# Patient Record
Sex: MALE | Race: WHITE | NOT HISPANIC OR LATINO
[De-identification: names, ages, dates, MRNs, and addresses within clinical notes are randomized per-mention and may not be internally consistent; named-entity substitution may affect disease eponyms.]

---

## 2017-03-22 PROBLEM — Z00.00 ENCOUNTER FOR PREVENTIVE HEALTH EXAMINATION: Status: ACTIVE | Noted: 2017-03-22

## 2017-06-20 ENCOUNTER — APPOINTMENT (OUTPATIENT)
Dept: CARDIOLOGY | Facility: CLINIC | Age: 57
End: 2017-06-20

## 2017-07-24 ENCOUNTER — APPOINTMENT (OUTPATIENT)
Dept: CARDIOLOGY | Facility: CLINIC | Age: 57
End: 2017-07-24

## 2017-07-25 ENCOUNTER — APPOINTMENT (OUTPATIENT)
Dept: CARDIOLOGY | Facility: CLINIC | Age: 57
End: 2017-07-25

## 2018-03-12 ENCOUNTER — CLINICAL ADVICE (OUTPATIENT)
Age: 58
End: 2018-03-12

## 2018-03-26 ENCOUNTER — APPOINTMENT (OUTPATIENT)
Dept: CARDIOTHORACIC SURGERY | Facility: CLINIC | Age: 58
End: 2018-03-26
Payer: COMMERCIAL

## 2018-03-26 VITALS
HEIGHT: 74 IN | OXYGEN SATURATION: 96 % | BODY MASS INDEX: 28.49 KG/M2 | SYSTOLIC BLOOD PRESSURE: 126 MMHG | HEART RATE: 69 BPM | WEIGHT: 222 LBS | DIASTOLIC BLOOD PRESSURE: 80 MMHG

## 2018-03-26 PROCEDURE — 99245 OFF/OP CONSLTJ NEW/EST HI 55: CPT

## 2018-05-07 ENCOUNTER — MEDICATION RENEWAL (OUTPATIENT)
Age: 58
End: 2018-05-07

## 2018-06-29 ENCOUNTER — RECORD ABSTRACTING (OUTPATIENT)
Age: 58
End: 2018-06-29

## 2018-06-29 DIAGNOSIS — Z80.42 FAMILY HISTORY OF MALIGNANT NEOPLASM OF PROSTATE: ICD-10-CM

## 2018-06-29 DIAGNOSIS — Z78.9 OTHER SPECIFIED HEALTH STATUS: ICD-10-CM

## 2018-06-29 DIAGNOSIS — K21.9 GASTRO-ESOPHAGEAL REFLUX DISEASE W/OUT ESOPHAGITIS: ICD-10-CM

## 2018-06-29 DIAGNOSIS — Z82.49 FAMILY HISTORY OF ISCHEMIC HEART DISEASE AND OTHER DISEASES OF THE CIRCULATORY SYSTEM: ICD-10-CM

## 2018-06-29 DIAGNOSIS — Z82.3 FAMILY HISTORY OF STROKE: ICD-10-CM

## 2018-07-06 ENCOUNTER — MEDICATION RENEWAL (OUTPATIENT)
Age: 58
End: 2018-07-06

## 2018-07-24 ENCOUNTER — NON-APPOINTMENT (OUTPATIENT)
Age: 58
End: 2018-07-24

## 2018-07-24 ENCOUNTER — APPOINTMENT (OUTPATIENT)
Dept: CARDIOLOGY | Facility: CLINIC | Age: 58
End: 2018-07-24
Payer: COMMERCIAL

## 2018-07-24 VITALS
HEIGHT: 74 IN | SYSTOLIC BLOOD PRESSURE: 120 MMHG | BODY MASS INDEX: 27.59 KG/M2 | DIASTOLIC BLOOD PRESSURE: 80 MMHG | WEIGHT: 215 LBS | HEART RATE: 76 BPM

## 2018-07-24 PROCEDURE — 99214 OFFICE O/P EST MOD 30 MIN: CPT

## 2018-07-24 PROCEDURE — 93000 ELECTROCARDIOGRAM COMPLETE: CPT

## 2018-08-06 ENCOUNTER — MEDICATION RENEWAL (OUTPATIENT)
Age: 58
End: 2018-08-06

## 2018-08-06 RX ORDER — VALSARTAN 80 MG/1
80 TABLET, COATED ORAL DAILY
Qty: 90 | Refills: 3 | Status: DISCONTINUED | COMMUNITY
End: 2018-08-06

## 2019-07-10 ENCOUNTER — RX RENEWAL (OUTPATIENT)
Age: 59
End: 2019-07-10

## 2019-07-17 ENCOUNTER — APPOINTMENT (OUTPATIENT)
Dept: CARDIOLOGY | Facility: CLINIC | Age: 59
End: 2019-07-17
Payer: COMMERCIAL

## 2019-07-17 ENCOUNTER — NON-APPOINTMENT (OUTPATIENT)
Age: 59
End: 2019-07-17

## 2019-07-17 VITALS
BODY MASS INDEX: 28.36 KG/M2 | DIASTOLIC BLOOD PRESSURE: 60 MMHG | HEART RATE: 71 BPM | WEIGHT: 221 LBS | OXYGEN SATURATION: 97 % | HEIGHT: 74 IN | SYSTOLIC BLOOD PRESSURE: 110 MMHG

## 2019-07-17 PROCEDURE — 99214 OFFICE O/P EST MOD 30 MIN: CPT

## 2019-07-17 PROCEDURE — 93000 ELECTROCARDIOGRAM COMPLETE: CPT

## 2019-07-24 NOTE — DISCUSSION/SUMMARY
[FreeTextEntry1] : 58-year-old gentleman. History of mitral valve repair many years ago. Class I. Functional status. Stable Blood pressure.Noted to have hyperglycemia with elevated triglyceride and elevated glucose. He is not following his dietary restrictions and does not do regular exercises as  in the past. EKG without any acute changes.\par \par At present. Recommended him.\par Echocardiogram ordered for evaluation of Mitral valve repair, mitral insufficiency, left atrial size\par Continue with dietary restrictions. Regular exercise program.\par SBE prophylaxis.\par More aggressive dietary restrictions were saturated with carbohydrate intake.\par fish oil 1 g twice daily\par He does not want to increase atorvastatin at present\par Repeat blood glucose level and lipid panel.\par If continued elevated triglycerides and LDL cholesterol increase atorvastatin to 20 mg or 40 mg dosage.\par High risk symptoms to notify us were reviewed\par Preventive care with your office\par \par All the above were at length reviewed. Answered all the questions. Thank you very much for this kind referral. Please do not hesitate to give me a call for any question.\par Part of this transcription was done with voice recognition software and phonetically similar errors are common. I apologize for that. Please donot hesitate to call for any questions due to above.\par \par Followup is planned in one year.\par \par ADD\par Echocardiogram is being repeated because last echocardiogram done 2 years ago, had shown mitral valve gradient of 4 mmHg in the presence of mitral valve repair, suggestive of mitral stenosis. This needs to be followed more closely even when he is asymptomatic. Any significant worsening of mitral stenosis can lead to worsening. Chamber dimensions and increased risk of cardiac arrhythmia.

## 2019-07-24 NOTE — REVIEW OF SYSTEMS
[Under Stress] : under stress [Negative] : Heme/Lymph [Memory Lapses Or Loss] : no memory lapses or loss [Confusion] : no confusion was observed [Depression] : no depression [Anxiety] : no anxiety [Suicidal] : not suicidal

## 2019-07-24 NOTE — ASSESSMENT
[FreeTextEntry1] : EKG ordered and interpreted by me. Normal sinus rhythm.\par \par Reviewed on July 17, 2019\par EKG normal sinus rhythm. Incomplete right bundle branch block.\par Labs platelet count 120, hemoglobin 14.7, white count 5.9, sodium 140, potassium 4.2, creatinine 0.8, glucose 113 LFTs normal. Triglycerides 333, HDL 32, LDL 92, total cholesterol 191

## 2019-07-24 NOTE — REASON FOR VISIT
[Follow-Up - Clinic] : a clinic follow-up of [Hyperlipidemia] : hyperlipidemia [Hypertension] : hypertension [Mitral Regurgitation] : mitral regurgitation [FreeTextEntry1] : 58-year-old gentleman comes for followup consultation after one year in presence of a history of\par Mitral valve repair for mitral regurgitation.\par Essential hypertension.\par Hyperlipidemia.\par \par Offers no complaint of chest pain. No PND, orthopnea, or palpitations.\par No pedal edema.\par Good exercise tolerance without any symptoms.\par Stable. Diet and activity.\par No claudication pain.\par No recent hospitalization.\par

## 2019-07-24 NOTE — PHYSICAL EXAM
[General Appearance - Well Developed] : well developed [Normal Appearance] : normal appearance [Well Groomed] : well groomed [General Appearance - Well Nourished] : well nourished [No Deformities] : no deformities [General Appearance - In No Acute Distress] : no acute distress [Normal Conjunctiva] : the conjunctiva exhibited no abnormalities [Eyelids - No Xanthelasma] : the eyelids demonstrated no xanthelasmas [No Oral Pallor] : no oral pallor [Normal Jugular Venous A Waves Present] : normal jugular venous A waves present [Normal Jugular Venous V Waves Present] : normal jugular venous V waves present [No Jugular Venous Barajas A Waves] : no jugular venous barajas A waves [] : no respiratory distress [Respiration, Rhythm And Depth] : normal respiratory rhythm and effort [Exaggerated Use Of Accessory Muscles For Inspiration] : no accessory muscle use [Auscultation Breath Sounds / Voice Sounds] : lungs were clear to auscultation bilaterally [Heart Rate And Rhythm] : heart rate and rhythm were normal [Heart Sounds] : normal S1 and S2 [Murmurs] : no murmurs present [Arterial Pulses Normal] : the arterial pulses were normal [Edema] : no peripheral edema present [Veins - Varicosity Changes] : no varicosital changes were noted in the lower extremities [Abdomen Soft] : soft [Abnormal Walk] : normal gait [Nail Clubbing] : no clubbing of the fingernails [Cyanosis, Localized] : no localized cyanosis [Oriented To Time, Place, And Person] : oriented to person, place, and time [No Anxiety] : not feeling anxious [FreeTextEntry1] : No gallop, rub, heave no bruits

## 2019-07-25 ENCOUNTER — APPOINTMENT (OUTPATIENT)
Dept: CARDIOLOGY | Facility: CLINIC | Age: 59
End: 2019-07-25

## 2019-07-26 ENCOUNTER — APPOINTMENT (OUTPATIENT)
Dept: CARDIOLOGY | Facility: CLINIC | Age: 59
End: 2019-07-26

## 2019-09-03 ENCOUNTER — RESULT REVIEW (OUTPATIENT)
Age: 59
End: 2019-09-03

## 2019-09-03 ENCOUNTER — APPOINTMENT (OUTPATIENT)
Dept: CARDIOLOGY | Facility: CLINIC | Age: 59
End: 2019-09-03
Payer: COMMERCIAL

## 2019-09-03 PROCEDURE — 93306 TTE W/DOPPLER COMPLETE: CPT

## 2019-09-04 ENCOUNTER — APPOINTMENT (OUTPATIENT)
Dept: CARDIOLOGY | Facility: CLINIC | Age: 59
End: 2019-09-04

## 2020-07-28 ENCOUNTER — APPOINTMENT (OUTPATIENT)
Dept: CARDIOLOGY | Facility: CLINIC | Age: 60
End: 2020-07-28
Payer: COMMERCIAL

## 2020-07-28 VITALS
HEART RATE: 72 BPM | SYSTOLIC BLOOD PRESSURE: 112 MMHG | HEIGHT: 74 IN | DIASTOLIC BLOOD PRESSURE: 64 MMHG | OXYGEN SATURATION: 96 % | BODY MASS INDEX: 27.21 KG/M2 | WEIGHT: 212 LBS

## 2020-07-28 PROCEDURE — 99214 OFFICE O/P EST MOD 30 MIN: CPT

## 2020-07-28 NOTE — REASON FOR VISIT
[Hyperlipidemia] : hyperlipidemia [Follow-Up - Clinic] : a clinic follow-up of [Hypertension] : hypertension [Mitral Regurgitation] : mitral regurgitation [FreeTextEntry1] : 59-year-old gentleman comes for followup consultation after one year in presence of a history of\par Mitral valve repair for mitral regurgitation.  Mild residual mitral regurgitation\par Essential hypertension.\par Hyperlipidemia.\par \par Offers no complaint of chest pain. No PND, orthopnea, or palpitations.\par No pedal edema.\par Good exercise tolerance without any symptoms.\par Stable. Diet and activity.\par No claudication pain.\par No recent hospitalization.\par I have reviewed his labs

## 2020-07-28 NOTE — PHYSICAL EXAM
[General Appearance - Well Developed] : well developed [Normal Appearance] : normal appearance [Well Groomed] : well groomed [No Deformities] : no deformities [General Appearance - Well Nourished] : well nourished [Normal Conjunctiva] : the conjunctiva exhibited no abnormalities [General Appearance - In No Acute Distress] : no acute distress [Eyelids - No Xanthelasma] : the eyelids demonstrated no xanthelasmas [No Oral Pallor] : no oral pallor [Normal Jugular Venous A Waves Present] : normal jugular venous A waves present [Normal Jugular Venous V Waves Present] : normal jugular venous V waves present [No Jugular Venous Barajas A Waves] : no jugular venous barajas A waves [] : no respiratory distress [Respiration, Rhythm And Depth] : normal respiratory rhythm and effort [Exaggerated Use Of Accessory Muscles For Inspiration] : no accessory muscle use [Auscultation Breath Sounds / Voice Sounds] : lungs were clear to auscultation bilaterally [Heart Rate And Rhythm] : heart rate and rhythm were normal [Heart Sounds] : normal S1 and S2 [Murmurs] : no murmurs present [Edema] : no peripheral edema present [Arterial Pulses Normal] : the arterial pulses were normal [Veins - Varicosity Changes] : no varicosital changes were noted in the lower extremities [Abdomen Soft] : soft [Nail Clubbing] : no clubbing of the fingernails [Abnormal Walk] : normal gait [Cyanosis, Localized] : no localized cyanosis [No Anxiety] : not feeling anxious [Oriented To Time, Place, And Person] : oriented to person, place, and time [FreeTextEntry1] : No gallop, rub, heave no bruits

## 2020-07-28 NOTE — REVIEW OF SYSTEMS
[Under Stress] : under stress [Negative] : Endocrine [Confusion] : no confusion was observed [Memory Lapses Or Loss] : no memory lapses or loss [Depression] : no depression [Anxiety] : no anxiety [Suicidal] : not suicidal

## 2020-07-28 NOTE — ASSESSMENT
[FreeTextEntry1] : EKG ordered and interpreted by me. Normal sinus rhythm.\par \par Reviewed on July 17, 2019\par EKG normal sinus rhythm. Incomplete right bundle branch block.\par Labs platelet count 120, hemoglobin 14.7, white count 5.9, sodium 140, potassium 4.2, creatinine 0.8, glucose 113 LFTs normal. Triglycerides 333, HDL 32, LDL 92, total cholesterol 191\par \par Reviewed on July 28, 2020\par Echocardiogram September 3, 2019 EF 55% minimal mitral regurgitation mean mitral gradient 4 mmHg mildly dilated left atrium pulmonary pressures normal\par Labs from July 21, 2020 CBC relatively stable creatinine 0.82 potassium 4.6 sodium 140 LFT normal  HDL 34 triglycerides 221 total cholesterol 179

## 2020-07-28 NOTE — DISCUSSION/SUMMARY
[FreeTextEntry1] : 59-year-old gentleman. History of mitral valve repair many years ago. Class I. Functional status. Stable Blood pressure.Noted to have hyperglycemia with elevated triglyceride and elevated glucose.\par \par At present. Recommended him.\par Echocardiogram ordered for evaluation of Mitral valve repair, residual mitral insufficiency, left atrial size\par Continue with dietary restrictions. Regular exercise program.\par SBE prophylaxis.\par More aggressive dietary restrictions were saturated with carbohydrate intake.\par fish oil 1 g twice daily\par Last office visit he did not want increased dose of atorvastatin.  He will continue with aggressive lifestyle and risk factor modifications.\par Follow labs with your office which includes blood glucose level\par Preventive care with your office\par \par Counseling regarding low saturated fat, salt and carbohydrate intake was reviewed. Active lifestyle and regular. Exercise along with weight management is advised.\par All the above were at length reviewed. Answered all the questions. Thank you very much for this kind referral. Please do not hesitate to give me a call for any question.\par Part of this transcription was done with voice recognition software and phonetically similar errors are common. I apologize for that. Please donot hesitate to call for any questions due to above.\par \par Followup is planned in one year.\par \par ADD\par Echocardiogram is being repeated because last echocardiogram done 2 years ago, had shown mitral valve gradient of 4 mmHg in the presence of mitral valve repair, suggestive of mitral stenosis. This needs to be followed more closely even when he is asymptomatic. Any significant worsening of mitral stenosis can lead to worsening. Chamber dimensions and increased risk of cardiac arrhythmia.

## 2020-10-21 ENCOUNTER — APPOINTMENT (OUTPATIENT)
Dept: CARDIOLOGY | Facility: CLINIC | Age: 60
End: 2020-10-21
Payer: COMMERCIAL

## 2020-10-21 PROCEDURE — 93306 TTE W/DOPPLER COMPLETE: CPT

## 2020-10-23 ENCOUNTER — APPOINTMENT (OUTPATIENT)
Dept: CARDIOLOGY | Facility: CLINIC | Age: 60
End: 2020-10-23
Payer: COMMERCIAL

## 2020-10-23 VITALS — BODY MASS INDEX: 27.59 KG/M2 | WEIGHT: 215 LBS | HEIGHT: 74 IN

## 2020-10-23 PROCEDURE — 99442: CPT

## 2020-10-23 NOTE — ASSESSMENT
[FreeTextEntry1] : EKG ordered and interpreted by me. Normal sinus rhythm.\par \par Reviewed on July 17, 2019\par EKG normal sinus rhythm. Incomplete right bundle branch block.\par Labs platelet count 120, hemoglobin 14.7, white count 5.9, sodium 140, potassium 4.2, creatinine 0.8, glucose 113 LFTs normal. Triglycerides 333, HDL 32, LDL 92, total cholesterol 191\par \par Reviewed on July 28, 2020\par Echocardiogram September 3, 2019 EF 55% minimal mitral regurgitation mean mitral gradient 4 mmHg mildly dilated left atrium pulmonary pressures normal\par Labs from July 21, 2020 CBC relatively stable creatinine 0.82 potassium 4.6 sodium 140 LFT normal  HDL 34 triglycerides 221 total cholesterol 179\par \par Reviewed on October 23, 2020.\par Echocardiogram reviewed\par Labs reviewed with normal lipoprotein a done on October 14, 2020.

## 2020-10-23 NOTE — DISCUSSION/SUMMARY
[FreeTextEntry1] : 59-year-old gentleman. History of mitral and tricuspid valve repair many years ago. Class I. Functional status. Stable Blood pressure.Noted to have hyperglycemia with elevated triglyceride and elevated glucose.\par At present stable echocardiogram reviewed at length.\par \par \par At present. Recommended him.\par \par Continue with dietary restrictions. Regular exercise program.\par SBE prophylaxis.\par More aggressive dietary restrictions were saturated with carbohydrate intake.\par fish oil 1 g twice daily\par Last office visit he did not want increased dose of atorvastatin.  He will continue with aggressive lifestyle and risk factor modifications.\par Follow labs with your office which includes blood glucose level\par Preventive care with your office\par \par Counseling regarding low saturated fat, salt and carbohydrate intake was reviewed. Active lifestyle and regular. Exercise along with weight management is advised.\par All the above were at length reviewed. Answered all the questions. Thank you very much for this kind referral. Please do not hesitate to give me a call for any question.\par Part of this transcription was done with voice recognition software and phonetically similar errors are common. I apologize for that. Please donot hesitate to call for any questions due to above.\par \par Followup is planned in one year.\par

## 2020-10-23 NOTE — REASON FOR VISIT
[FreeTextEntry2] : telephone visit  [FreeTextEntry1] : Consent: Patient consented to telephone visit.\par Time: A total of 12  minutes was spent in review of pertinent medical records, discussion with the patient, evaluation of patient problem, and coordination of a care plan as part of this telephone visit.\par Physical examination was not performed as a  the risk of COVID-19 cross-contamination to be greater than the benefit to the patient conferred by the physical examination.\par \par \par 59-year-old gentleman comes for 2 way audio consultation to review echocardiogram in presence of a history of\par Mitral valve repair for mitral regurgitation.  Mild residual mitral regurgitation .\par  tricuspid valve repair.\par Essential hypertension.\par Hyperlipidemia.\par \par Offers no complaint of exertional chest pain. No PND, orthopnea, or palpitations.\par No pedal edema.\par Good exercise tolerance without any symptoms.\par Stable. Diet and activity.\par No claudication pain.\par No recent hospitalization.\par I have reviewed his labs

## 2020-10-23 NOTE — REVIEW OF SYSTEMS
[Confusion] : no confusion was observed [Memory Lapses Or Loss] : no memory lapses or loss [Depression] : no depression [Anxiety] : no anxiety [Under Stress] : under stress [Suicidal] : not suicidal [Negative] : Heme/Lymph

## 2020-11-06 DIAGNOSIS — R79.89 OTHER SPECIFIED ABNORMAL FINDINGS OF BLOOD CHEMISTRY: ICD-10-CM

## 2021-09-22 ENCOUNTER — NON-APPOINTMENT (OUTPATIENT)
Age: 61
End: 2021-09-22

## 2021-09-22 ENCOUNTER — APPOINTMENT (OUTPATIENT)
Dept: CARDIOLOGY | Facility: CLINIC | Age: 61
End: 2021-09-22
Payer: COMMERCIAL

## 2021-09-22 VITALS
SYSTOLIC BLOOD PRESSURE: 122 MMHG | OXYGEN SATURATION: 94 % | BODY MASS INDEX: 26.44 KG/M2 | HEART RATE: 67 BPM | WEIGHT: 206 LBS | HEIGHT: 74 IN | DIASTOLIC BLOOD PRESSURE: 70 MMHG

## 2021-09-22 PROCEDURE — 93000 ELECTROCARDIOGRAM COMPLETE: CPT

## 2021-09-22 PROCEDURE — 99214 OFFICE O/P EST MOD 30 MIN: CPT

## 2021-09-22 NOTE — DISCUSSION/SUMMARY
[FreeTextEntry1] : 60-year-old gentleman. History of mitral and tricuspid valve repair many years ago. Class I. Functional status.  Stable blood pressure.  Lipid panel with LDL cholesterol greater than 100.  HDL cholesterol less than 40.  Triglycerides stable.\par \par \par \par At present. Recommended him.\par Echocardiogram follow-up on mitral valve and tricuspid valve repair which is more than 10 years ago with residual mitral regurgitation.  If any worsening noted will have to do a closer follow-up.\par Consider increasing dose of atorvastatin to 20 mg.  Careful follow-up on any side effects.  He would like to discuss this with you and start.  \par Continue with dietary restrictions. Regular exercise program.\par SBE prophylaxis.\par More aggressive dietary restrictions were saturated with carbohydrate intake.\par Preventive care with your office\par \par Counseling regarding low saturated fat, salt and carbohydrate intake was reviewed. Active lifestyle and regular. Exercise along with weight management is advised.\par All the above were at length reviewed. Answered all the questions. Thank you very much for this kind referral. Please do not hesitate to give me a call for any question.\par Part of this transcription was done with voice recognition software and phonetically similar errors are common. I apologize for that. Please donot hesitate to call for any questions due to above.\par \par Followup is planned in one year.\par He will call me for review after his echocardiogram.\par

## 2021-09-22 NOTE — ASSESSMENT
[FreeTextEntry1] : EKG ordered and interpreted by me. Normal sinus rhythm.\par \par Reviewed on July 17, 2019\par EKG normal sinus rhythm. Incomplete right bundle branch block.\par Labs platelet count 120, hemoglobin 14.7, white count 5.9, sodium 140, potassium 4.2, creatinine 0.8, glucose 113 LFTs normal. Triglycerides 333, HDL 32, LDL 92, total cholesterol 191\par \par Reviewed on July 28, 2020\par Echocardiogram September 3, 2019 EF 55% minimal mitral regurgitation mean mitral gradient 4 mmHg mildly dilated left atrium pulmonary pressures normal\par Labs from July 21, 2020 CBC relatively stable creatinine 0.82 potassium 4.6 sodium 140 LFT normal  HDL 34 triglycerides 221 total cholesterol 179\par \par Reviewed on October 23, 2020.\par Echocardiogram reviewed\par Labs reviewed with normal lipoprotein a done on October 14, 2020.\par \par Reviewed September 22, 2021.\par Labs from August 11, 2021.  Sodium 140 potassium 4.1 creatinine 0.84.  Total cholesterol 180 triglycerides 128 HDL 38 .

## 2021-09-22 NOTE — CARDIOLOGY SUMMARY
[de-identified] : September 22, 2021.  Normal sinus rhythm [___] : [unfilled] [LVEF ___%] : LVEF [unfilled]% [None] : no pulmonary hypertension [Normal] : normal LA size

## 2021-09-22 NOTE — REASON FOR VISIT
[Structural Heart and Valve Disease] : structural heart and valve disease [Hyperlipidemia] : hyperlipidemia [Hypertension] : hypertension [FreeTextEntry3] : Dr. Cameron [FreeTextEntry1] : 60-year-old gentleman is seen in follow-up consultation to review labs, medical management in presence of history of\par Mitral valve repair for mitral regurgitation.  2010 mild residual mitral regurgitation .\par  tricuspid valve repair.  2010\par Essential hypertension.\par Hyperlipidemia.\par \par Offers no complaint of exertional chest pain. No PND, orthopnea, or palpitations.  Recently hiked in [facility] in [place] almost 28 miles without any significant symptoms.\par No pedal edema.\par Good exercise tolerance without any symptoms.\par Stable. Diet and activity.\par No claudication pain.\par No recent hospitalization.\par Earlier in the year he was evaluated for nonspecific abdominal pain with multiple testing including colonoscopy endoscopy.  No abnormality found.  It resolved on its own. [FreeTextEntry2] : telephone visit

## 2021-10-26 ENCOUNTER — APPOINTMENT (OUTPATIENT)
Dept: CARDIOLOGY | Facility: CLINIC | Age: 61
End: 2021-10-26

## 2022-02-09 ENCOUNTER — NON-APPOINTMENT (OUTPATIENT)
Age: 62
End: 2022-02-09

## 2022-02-10 ENCOUNTER — NON-APPOINTMENT (OUTPATIENT)
Age: 62
End: 2022-02-10

## 2022-02-11 ENCOUNTER — APPOINTMENT (OUTPATIENT)
Dept: CARDIOLOGY | Facility: CLINIC | Age: 62
End: 2022-02-11

## 2022-02-11 ENCOUNTER — NON-APPOINTMENT (OUTPATIENT)
Age: 62
End: 2022-02-11

## 2022-02-16 ENCOUNTER — NON-APPOINTMENT (OUTPATIENT)
Age: 62
End: 2022-02-16

## 2022-02-18 ENCOUNTER — APPOINTMENT (OUTPATIENT)
Dept: CARDIOLOGY | Facility: CLINIC | Age: 62
End: 2022-02-18

## 2022-04-06 ENCOUNTER — APPOINTMENT (OUTPATIENT)
Dept: CARDIOLOGY | Facility: CLINIC | Age: 62
End: 2022-04-06
Payer: COMMERCIAL

## 2022-04-06 VITALS
HEART RATE: 71 BPM | SYSTOLIC BLOOD PRESSURE: 118 MMHG | WEIGHT: 218 LBS | OXYGEN SATURATION: 95 % | DIASTOLIC BLOOD PRESSURE: 60 MMHG | HEIGHT: 74 IN | BODY MASS INDEX: 27.98 KG/M2

## 2022-04-06 PROCEDURE — 99214 OFFICE O/P EST MOD 30 MIN: CPT

## 2022-04-06 PROCEDURE — 93306 TTE W/DOPPLER COMPLETE: CPT

## 2022-04-06 NOTE — ASSESSMENT
[FreeTextEntry1] : EKG ordered and interpreted by me. Normal sinus rhythm.\par \par Reviewed on July 17, 2019\par EKG normal sinus rhythm. Incomplete right bundle branch block.\par Labs platelet count 120, hemoglobin 14.7, white count 5.9, sodium 140, potassium 4.2, creatinine 0.8, glucose 113 LFTs normal. Triglycerides 333, HDL 32, LDL 92, total cholesterol 191\par \par Reviewed on July 28, 2020\par Echocardiogram September 3, 2019 EF 55% minimal mitral regurgitation mean mitral gradient 4 mmHg mildly dilated left atrium pulmonary pressures normal\par Labs from July 21, 2020 CBC relatively stable creatinine 0.82 potassium 4.6 sodium 140 LFT normal  HDL 34 triglycerides 221 total cholesterol 179\par \par Reviewed on October 23, 2020.\par Echocardiogram reviewed\par Labs reviewed with normal lipoprotein a done on October 14, 2020.\par \par Reviewed September 22, 2021.\par Labs from August 11, 2021.  Sodium 140 potassium 4.1 creatinine 0.84.  Total cholesterol 180 triglycerides 128 HDL 38 .\par \par Reviewed on April 6, 2022.\par Labs from August 11, 2021 were again reviewed in relation to lipid panel.\par Echocardiogram done today for 6/20/2022 was reviewed.  It was read and interpreted by me.

## 2022-04-06 NOTE — PHYSICAL EXAM
[Well Developed] : well developed [Well Nourished] : well nourished [No Acute Distress] : no acute distress [Normal Venous Pressure] : normal venous pressure [No Carotid Bruit] : no carotid bruit [Normal S1, S2] : normal S1, S2 [No Murmur] : no murmur [No Rub] : no rub [No Gallop] : no gallop [Clear Lung Fields] : clear lung fields [Good Air Entry] : good air entry [No Respiratory Distress] : no respiratory distress  [Soft] : abdomen soft [Non Tender] : non-tender [No Masses/organomegaly] : no masses/organomegaly [Normal Bowel Sounds] : normal bowel sounds [Normal Gait] : normal gait [No Edema] : no edema [No Cyanosis] : no cyanosis [No Clubbing] : no clubbing [No Varicosities] : no varicosities [Normal Radial B/L] : normal radial B/L [Normal PT B/L] : normal PT B/L [Normal DP B/L] : normal DP B/L [Moves all extremities] : moves all extremities [Normal Speech] : normal speech [Alert and Oriented] : alert and oriented

## 2022-04-06 NOTE — REASON FOR VISIT
[Structural Heart and Valve Disease] : structural heart and valve disease [Hyperlipidemia] : hyperlipidemia [Hypertension] : hypertension [FreeTextEntry3] : Dr. Cameron [FreeTextEntry1] : 61-year-old gentleman is seen in follow-up consultation to review labs, medical management in presence of history of\par Mitral valve repair for mitral regurgitation.  2010 mild residual mitral regurgitation/mild increase mitral valve gradient.\par And tricuspid valve repair.  2010\par Essential hypertension.  No CHF.  No CKD.  Non-smoker\par Hyperlipidemia.  On statin therapy\par \par Also complaining of right thigh intermittent localized pain.  With or without exertion.  No weakness.\par \par Offers no complaint of exertional chest pain. No PND, orthopnea, or palpitations.  \par No pedal edema.\par Good exercise tolerance without any symptoms.\par Stable. Diet and activity.\par No claudication pain.\par No recent hospitalization.

## 2022-04-06 NOTE — DISCUSSION/SUMMARY
[FreeTextEntry1] : 61-year-old gentleman with above medical history and active medical problems as noted below\par 1.  Mitral and tricuspid valve repair.  Minimal regurgitation noted.  Mild increase mitral gradient which is stable.  Preserved EF.  Normal chamber dimensions.  Clinically class I functional status.  Recommendation includes\par SBE prophylaxis\par Periodic follow-up\par Any change in clinical status which have been reviewed to notify us or call 911 in the form of shortness of breath, chest pain, near syncopal or syncopal event.\par 2.  Essential hypertension.  Very well controlled at present.  On medications.  Follow labs for potassium and creatinine closely with use of telmisartan.  Goal less than 130/80.  Nonpharmacological lifestyle modifications reviewed.\par 3.  Dyslipidemia.  On statin therapy.  Goal would be less than 100 preferably less than 70.  Unable to tolerate higher dose.  Lifestyle modifications reviewed.  Follow liver function tests lipid panel.  Labs ordered.\par 4.  Localized right thigh pain.  Probably musculoskeletal.  Total CPK ordered to rule out any myositis or muscular injury.  Follow-up with your office.\par \par Counseling regarding low saturated fat, salt and carbohydrate intake was reviewed. Active lifestyle and regular. Exercise along with weight management is advised.\par All the above were at length reviewed. Answered all the questions. Thank you very much for this kind referral. Please do not hesitate to give me a call for any question.\par Part of this transcription was done with voice recognition software and phonetically similar errors are common. I apologize for that. Please donot hesitate to call for any questions due to above.\par \par Followup is planned in one year.\par \par Sincerely,\par Parul Jacobson MD,FACC,FASE\par

## 2022-04-06 NOTE — CARDIOLOGY SUMMARY
[de-identified] : September 22, 2021.  Normal sinus rhythm [de-identified] : April 6, 2022.\par LVEF 60% minimal mitral regurgitation annuloplasty mitral and tricuspid position.  Mean mitral gradient 5 mmHg.  Minimal aortic regurgitation.  Minimal tricuspid regurgitation RVSP 19.  Normal left and right atrial size. [___] : [unfilled] [LVEF ___%] : LVEF [unfilled]% [None] : no pulmonary hypertension [Normal] : normal LA size

## 2022-05-18 ENCOUNTER — APPOINTMENT (OUTPATIENT)
Dept: ORTHOPEDIC SURGERY | Facility: CLINIC | Age: 62
End: 2022-05-18
Payer: COMMERCIAL

## 2022-05-18 DIAGNOSIS — Z85.831 PERSONAL HISTORY OF MALIGNANT NEOPLASM OF SOFT TISSUE: ICD-10-CM

## 2022-05-18 PROCEDURE — 99214 OFFICE O/P EST MOD 30 MIN: CPT

## 2022-05-18 PROCEDURE — 72040 X-RAY EXAM NECK SPINE 2-3 VW: CPT

## 2022-05-18 RX ORDER — CALCIUM POLYCARBOPHIL 625 MG
625 TABLET ORAL TWICE DAILY
Refills: 0 | Status: DISCONTINUED | COMMUNITY
End: 2022-05-18

## 2022-05-18 NOTE — PHYSICAL EXAM
[NL (45)] : right lateral flexion 45 degrees [NL (80)] : right lateral rotation 80 degrees [5___] : right grasp 5[unfilled]/5 [Biceps 2+] : biceps 2+ [Triceps 2+] : triceps 2+ [Brachioradialis 2+] : brachioradialis 2+ [Disc space narrowing] : Disc space narrowing [NL (0-180)] : full active abduction 0-180 degrees [NL (0-70)] : full internal rotation 0-70 degrees [NL (0-90)] : full external rotation 0-90 degrees [4 ___] : forward flexion 4[unfilled]/5 [4___] : internal rotation 4[unfilled]/5 [Right] : right shoulder [There are no fractures, subluxations or dislocations. No significant abnormalities are seen] : There are no fractures, subluxations or dislocations. No significant abnormalities are seen [Calcific density] : Calcific density [] : negative [place]

## 2022-05-18 NOTE — PROCEDURE
[Large Joint Injection] : Large joint injection [Right] : of the right [Shoulder] : shoulder [Pain] : pain [Inflammation] : inflammation [X-ray evidence of Osteoarthritis on this or prior visit] : x-ray evidence of Osteoarthritis on this or prior visit [Alcohol] : alcohol [Betadine] : betadine [Ethyl Chloride sprayed topically] : ethyl chloride sprayed topically [___ cc    6mg] :  Betamethasone (Celestone) ~Vcc of 6mg [___ cc    1%] : Lidocaine ~Vcc of 1%  [] : Patient tolerated procedure well [Call if redness, pain or fever occur] : call if redness, pain or fever occur [Apply ice for 15min out of every hour for the next 12-24 hours as tolerated] : apply ice for 15 minutes out of every hour for the next 12-24 hours as tolerated [Patient was advised to rest the joint(s) for ____ days] : patient was advised to rest the joint(s) for [unfilled] days [Previous OTC use and PT nontherapeutic] : patient has tried OTC's including aspirin, Ibuprofen, Aleve, etc or prescription NSAIDS, and/or exercises at home and/or physical therapy without satisfactory response [Patient had decreased mobility in the joint] : patient had decreased mobility in the joint [Risks, benefits, alternatives discussed / Verbal consent obtained] : the risks benefits, and alternatives have been discussed, and verbal consent was obtained [All ultrasound images have been permanently captured and stored accordingly in our picture archiving and communication system] : All ultrasound images have been permanently captured and stored accordingly in our picture archiving and communication system

## 2022-05-18 NOTE — HISTORY OF PRESENT ILLNESS
[de-identified] : Patient reports severe pain in the posterior shoulder and tightness in the neck. He also reports some pain described as being in the shoulder joint with cruching on ROM. Recently he has some numbness in the RT pinky finger as well. The pain is worse with sitting and laying down. He went to Urgent Care on Friday where xrays were taken and has been taking 800mg Motrin for the pain

## 2022-06-01 ENCOUNTER — APPOINTMENT (OUTPATIENT)
Age: 62
End: 2022-06-01
Payer: COMMERCIAL

## 2022-06-01 DIAGNOSIS — S46.011D STRAIN OF MUSCLE(S) AND TENDON(S) OF THE ROTATOR CUFF OF RIGHT SHOULDER, SUBSEQUENT ENCOUNTER: ICD-10-CM

## 2022-06-01 PROCEDURE — 99213 OFFICE O/P EST LOW 20 MIN: CPT

## 2022-06-01 NOTE — HISTORY OF PRESENT ILLNESS
[de-identified] : Patient reports improvement with the injection after the first day. The pain did return but not to where it was prior to the injection . He is still taking the muscle relaxer.

## 2022-06-27 ENCOUNTER — APPOINTMENT (OUTPATIENT)
Dept: ORTHOPEDIC SURGERY | Facility: CLINIC | Age: 62
End: 2022-06-27
Payer: COMMERCIAL

## 2022-06-27 PROCEDURE — 99213 OFFICE O/P EST LOW 20 MIN: CPT

## 2022-06-27 NOTE — HISTORY OF PRESENT ILLNESS
[de-identified] : Patient reports that some days he feels better but other days he is not able to function due to the pain. He has been doing PT, HEP, taking NSAIDs and muscle relaxers since 5-

## 2022-07-06 ENCOUNTER — APPOINTMENT (OUTPATIENT)
Dept: ORTHOPEDIC SURGERY | Facility: CLINIC | Age: 62
End: 2022-07-06

## 2022-09-14 ENCOUNTER — APPOINTMENT (OUTPATIENT)
Dept: ORTHOPEDIC SURGERY | Facility: CLINIC | Age: 62
End: 2022-09-14

## 2022-09-14 PROCEDURE — 72100 X-RAY EXAM L-S SPINE 2/3 VWS: CPT

## 2022-09-14 PROCEDURE — 99214 OFFICE O/P EST MOD 30 MIN: CPT

## 2022-09-14 PROCEDURE — 73502 X-RAY EXAM HIP UNI 2-3 VIEWS: CPT | Mod: RT

## 2022-09-14 NOTE — PHYSICAL EXAM
[NL (40)] : right lateral bending 40 degrees [4___] : right quadriceps 4[unfilled]/5 [Straightening consistent with spasm] : Straightening consistent with spasm [Facet arthropathy] : Facet arthropathy [Disc space narrowing] : Disc space narrowing [TWNoteComboBox7] : forward flexion 75 degrees [de-identified] : extension 20 degrees [Right] : right hip [NL (120)] : flexion 120 degrees [NL (30)] : extension 30 degrees [NL (35)] : adduction 35 degrees [NL (45)] : internal rotation 45 degrees [5___] : adduction 5[unfilled]/5 [2+] : posterior tibialis pulse: 2+ [] : patient ambulates without assistive device

## 2022-09-14 NOTE — HISTORY OF PRESENT ILLNESS
[de-identified] : Patient denies any injury, he reports pain in the thigh that is described as "deep in the bone and intermittently there is a lump/muscle spasm in the top of the thigh". He is also c/o lateral hip pain. He denies any groin pain or pain into the calf or lower back.

## 2022-09-20 ENCOUNTER — FORM ENCOUNTER (OUTPATIENT)
Age: 62
End: 2022-09-20

## 2022-09-25 ENCOUNTER — FORM ENCOUNTER (OUTPATIENT)
Age: 62
End: 2022-09-25

## 2022-10-30 ENCOUNTER — FORM ENCOUNTER (OUTPATIENT)
Age: 62
End: 2022-10-30

## 2022-11-02 ENCOUNTER — APPOINTMENT (OUTPATIENT)
Dept: ORTHOPEDIC SURGERY | Facility: CLINIC | Age: 62
End: 2022-11-02

## 2022-11-02 DIAGNOSIS — M54.16 RADICULOPATHY, LUMBAR REGION: ICD-10-CM

## 2022-11-02 DIAGNOSIS — M54.12 RADICULOPATHY, CERVICAL REGION: ICD-10-CM

## 2022-11-02 PROCEDURE — 99214 OFFICE O/P EST MOD 30 MIN: CPT

## 2022-11-02 NOTE — PHYSICAL EXAM
[NL (80)] : right lateral rotation 80 degrees [Biceps 2+] : biceps 2+ [Triceps 2+] : triceps 2+ [Brachioradialis 2+] : brachioradialis 2+ [NL (40)] : right lateral bending 40 degrees [Straightening consistent with spasm] : Straightening consistent with spasm [Facet arthropathy] : Facet arthropathy [Disc space narrowing] : Disc space narrowing [NL (0-180)] : full active abduction 0-180 degrees [NL (0-70)] : full internal rotation 0-70 degrees [NL (0-90)] : full external rotation 0-90 degrees [4 ___] : forward flexion 4[unfilled]/5 [4___] : internal rotation 4[unfilled]/5 [There are no fractures, subluxations or dislocations. No significant abnormalities are seen] : There are no fractures, subluxations or dislocations. No significant abnormalities are seen [Calcific density] : Calcific density [Right] : right hip [NL (120)] : flexion 120 degrees [NL (30)] : extension 30 degrees [NL (35)] : adduction 35 degrees [NL (45)] : internal rotation 45 degrees [5___] : adduction 5[unfilled]/5 [2+] : posterior tibialis pulse: 2+ [TWNoteComboBox7] : forward flexion 75 degrees [de-identified] : extension 20 degrees [] : non-antalgic

## 2022-11-02 NOTE — HISTORY OF PRESENT ILLNESS
[de-identified] : Patient is here for MRI results of the c-spine\par He is also here to follow up for the lower back and thigh, which is still an issue, despite PT, HEP and medrol.

## 2022-11-06 ENCOUNTER — FORM ENCOUNTER (OUTPATIENT)
Age: 62
End: 2022-11-06

## 2022-11-07 ENCOUNTER — FORM ENCOUNTER (OUTPATIENT)
Age: 62
End: 2022-11-07

## 2022-11-08 ENCOUNTER — FORM ENCOUNTER (OUTPATIENT)
Age: 62
End: 2022-11-08

## 2022-11-14 ENCOUNTER — FORM ENCOUNTER (OUTPATIENT)
Age: 62
End: 2022-11-14

## 2022-11-30 ENCOUNTER — NON-APPOINTMENT (OUTPATIENT)
Age: 62
End: 2022-11-30

## 2022-11-30 ENCOUNTER — APPOINTMENT (OUTPATIENT)
Dept: CARDIOLOGY | Facility: CLINIC | Age: 62
End: 2022-11-30

## 2022-11-30 VITALS
HEART RATE: 68 BPM | WEIGHT: 218 LBS | HEIGHT: 74 IN | SYSTOLIC BLOOD PRESSURE: 128 MMHG | DIASTOLIC BLOOD PRESSURE: 70 MMHG | BODY MASS INDEX: 27.98 KG/M2 | OXYGEN SATURATION: 95 %

## 2022-11-30 VITALS — DIASTOLIC BLOOD PRESSURE: 78 MMHG | SYSTOLIC BLOOD PRESSURE: 126 MMHG

## 2022-11-30 PROCEDURE — 99214 OFFICE O/P EST MOD 30 MIN: CPT | Mod: 25

## 2022-11-30 PROCEDURE — 93000 ELECTROCARDIOGRAM COMPLETE: CPT

## 2022-11-30 PROCEDURE — 93242 EXT ECG>48HR<7D RECORDING: CPT | Mod: 59

## 2022-11-30 RX ORDER — METHOCARBAMOL 750 MG/1
750 TABLET, FILM COATED ORAL EVERY 8 HOURS
Qty: 90 | Refills: 0 | Status: DISCONTINUED | COMMUNITY
Start: 2022-06-21 | End: 2022-11-30

## 2022-11-30 RX ORDER — NAPROXEN 500 MG/1
500 TABLET ORAL
Qty: 60 | Refills: 0 | Status: DISCONTINUED | COMMUNITY
Start: 2022-05-18 | End: 2022-11-30

## 2022-11-30 RX ORDER — CYCLOBENZAPRINE HYDROCHLORIDE 5 MG/1
5 TABLET, FILM COATED ORAL 3 TIMES DAILY
Qty: 90 | Refills: 0 | Status: DISCONTINUED | COMMUNITY
Start: 2022-05-18 | End: 2022-11-30

## 2022-11-30 NOTE — REASON FOR VISIT
[Structural Heart and Valve Disease] : structural heart and valve disease [Hyperlipidemia] : hyperlipidemia [Hypertension] : hypertension [FreeTextEntry3] : Dr. Cameron [FreeTextEntry1] : 62-year-old gentleman was seen in the office for consultation because of intermittent complain of lightheadedness feeling.  When he looks down and or gets up.  2 episodes where while sitting down eating dinner he felt lightheaded and sweaty to the left side.  He was seen by ENT and ophthalmologist and they did not find anything significant.  He has noticed some nasal congestion.  None of his symptoms are associated with exertion.  No associated palpitations.  No headache visual disturbances or focal weakness.  He is doing his regular exercises without any significant problems.  He denies any associated chest pain or shortness of breath.  And no syncopal event.\par His medical history includes\par \par Mitral valve repair for mitral regurgitation.  2010 mild residual mitral regurgitation/mild increase mitral valve gradient.\par And tricuspid valve repair.  2010\par Essential hypertension.  No CHF.  No CKD.  Non-smoker\par Hyperlipidemia.  On statin therapy\par \par Also complaining of right thigh intermittent localized pain.  With or without exertion.  No weakness.\par \par Offers no complaint of exertional chest pain. No PND, orthopnea, or palpitations.  \par No pedal edema.\par Good exercise tolerance without any symptoms.\par Stable. Diet and activity.\par No claudication pain.\par No recent hospitalization.

## 2022-11-30 NOTE — DISCUSSION/SUMMARY
[FreeTextEntry1] : 62-year-old gentleman with above medical history and active medical problems as noted below\par 1.  Mitral and tricuspid valve repair.  Minimal regurgitation noted.  Mild increase mitral gradient which is stable.  Preserved EF.  Normal chamber dimensions.  Clinically class I functional status.  Recommendation includes\par SBE prophylaxis\par Periodic follow-up\par Any change in clinical status which have been reviewed to notify us or call 911 in the form of shortness of breath, chest pain, near syncopal or syncopal event.\par 2.  Essential hypertension.  Very well controlled at present.  On medications.  Follow labs for potassium and creatinine closely with use of telmisartan.  Goal less than 130/80.  Nonpharmacological lifestyle modifications reviewed.\par 3.  Dyslipidemia.  Significantly elevated triglycerides in the past.  Repeat labs.  On statin therapy.  Goal would be less than 100 preferably less than 70.  Unable to tolerate higher dose.  Lifestyle modifications reviewed.  Follow liver function tests lipid panel.  Labs ordered.\par 4.  Intermittent dizziness.  With movement of the neck.  No orthostatic changes in the office.  No vertiginous symptoms.\par Event monitor ordered to rule out any cardiac arrhythmias.  Probability of that is low in presence of this clinical findings\par Carotid Doppler study rule out any vertebral insufficiency.  \par Hydration.  Flonase nasal spray each nostril once daily and stimulation recommended.  Any significant worsening he will call 911.\par \par Counseling regarding low saturated fat, salt and carbohydrate intake was reviewed. Active lifestyle and regular. Exercise along with weight management is advised.\par All the above were at length reviewed. Answered all the questions. Thank you very much for this kind referral. Please do not hesitate to give me a call for any question.\par Part of this transcription was done with voice recognition software and phonetically similar errors are common. I apologize for that. Please donot hesitate to call for any questions due to above.\par \par Follow-up as advised\par \par Sincerely,\par Parul Jacobson MD,FACC,DEDE\par

## 2022-11-30 NOTE — REVIEW OF SYSTEMS
[Myalgia] : myalgia [Negative] : Heme/Lymph [FreeTextEntry4] : As per HPI [de-identified] : As per HPI

## 2022-11-30 NOTE — ASSESSMENT
[FreeTextEntry1] : EKG ordered and interpreted by me. Normal sinus rhythm.\par \par Reviewed on July 17, 2019\par EKG normal sinus rhythm. Incomplete right bundle branch block.\par Labs platelet count 120, hemoglobin 14.7, white count 5.9, sodium 140, potassium 4.2, creatinine 0.8, glucose 113 LFTs normal. Triglycerides 333, HDL 32, LDL 92, total cholesterol 191\par \par Reviewed on July 28, 2020\par Echocardiogram September 3, 2019 EF 55% minimal mitral regurgitation mean mitral gradient 4 mmHg mildly dilated left atrium pulmonary pressures normal\par Labs from July 21, 2020 CBC relatively stable creatinine 0.82 potassium 4.6 sodium 140 LFT normal  HDL 34 triglycerides 221 total cholesterol 179\par \par Reviewed on October 23, 2020.\par Echocardiogram reviewed\par Labs reviewed with normal lipoprotein a done on October 14, 2020.\par \par Reviewed September 22, 2021.\par Labs from August 11, 2021.  Sodium 140 potassium 4.1 creatinine 0.84.  Total cholesterol 180 triglycerides 128 HDL 38 .\par \par Reviewed on April 6, 2022.\par Labs from August 11, 2021 were again reviewed in relation to lipid panel.\par Echocardiogram done today for 6/20/2022 was reviewed.  It was read and interpreted by me.\par \par Reviewed on November 30, 2022\par EKG as noted above\par Recent labs August 24, 2022 stable CBC CMP total cholesterol 206 HDL 31 triglycerides elevated.

## 2022-11-30 NOTE — CARDIOLOGY SUMMARY
[___] : [unfilled] [LVEF ___%] : LVEF [unfilled]% [None] : no pulmonary hypertension [Normal] : normal LA size [de-identified] : September 22, 2021.  Normal sinus rhythm\par November 30, 2022.  Normal sinus rhythm [de-identified] : April 6, 2022.\par LVEF 60% minimal mitral regurgitation annuloplasty mitral and tricuspid position.  Mean mitral gradient 5 mmHg.  Minimal aortic regurgitation.  Minimal tricuspid regurgitation RVSP 19.  Normal left and right atrial size.

## 2022-12-11 ENCOUNTER — FORM ENCOUNTER (OUTPATIENT)
Age: 62
End: 2022-12-11

## 2022-12-13 ENCOUNTER — FORM ENCOUNTER (OUTPATIENT)
Age: 62
End: 2022-12-13

## 2022-12-13 ENCOUNTER — APPOINTMENT (OUTPATIENT)
Dept: CARDIOLOGY | Facility: CLINIC | Age: 62
End: 2022-12-13

## 2022-12-13 PROCEDURE — 93880 EXTRACRANIAL BILAT STUDY: CPT

## 2022-12-14 ENCOUNTER — NON-APPOINTMENT (OUTPATIENT)
Age: 62
End: 2022-12-14

## 2022-12-15 ENCOUNTER — APPOINTMENT (OUTPATIENT)
Dept: ORTHOPEDIC SURGERY | Facility: CLINIC | Age: 62
End: 2022-12-15

## 2022-12-19 ENCOUNTER — APPOINTMENT (OUTPATIENT)
Dept: CARDIOLOGY | Facility: CLINIC | Age: 62
End: 2022-12-19

## 2022-12-19 PROCEDURE — 99214 OFFICE O/P EST MOD 30 MIN: CPT | Mod: 95

## 2022-12-19 PROCEDURE — 93244 EXT ECG>48HR<7D REV&INTERPJ: CPT

## 2022-12-19 NOTE — CARDIOLOGY SUMMARY
[___] : [unfilled] [LVEF ___%] : LVEF [unfilled]% [___] : [unfilled] [None] : no pulmonary hypertension [Normal] : normal LA size [de-identified] : September 22, 2021.  Normal sinus rhythm\par November 30, 2022.  Normal sinus rhythm [de-identified] : Event monitor December 2022.  Extrasystoles.  Nonsustained arrhythmias. [de-identified] : April 6, 2022.\par LVEF 60% minimal mitral regurgitation annuloplasty mitral and tricuspid position.  Mean mitral gradient 5 mmHg.  Minimal aortic regurgitation.  Minimal tricuspid regurgitation RVSP 19.  Normal left and right atrial size. [de-identified] : Bilateral carotid Doppler study December 2022 no significant obstruction of carotid or vertebral spine

## 2022-12-19 NOTE — REVIEW OF SYSTEMS
[Myalgia] : myalgia [Negative] : Heme/Lymph [FreeTextEntry4] : As per HPI [de-identified] : As per HPI

## 2022-12-19 NOTE — DISCUSSION/SUMMARY
[FreeTextEntry1] : 62-year-old gentleman with above medical history and active medical problems as noted below\par 1.  Mitral and tricuspid valve repair.  Minimal regurgitation noted.  Mild increase mitral gradient which is stable.  Preserved EF.  Normal chamber dimensions.  Clinically class I functional status.  Recommendation includes\par SBE prophylaxis\par Periodic follow-up\par Any change in clinical status which have been reviewed to notify us or call 911 in the form of shortness of breath, chest pain, near syncopal or syncopal event.\par 2.  Essential hypertension.  Has remained stable and well-controlled .  On medications.  Follow labs for potassium and creatinine closely with use of telmisartan.  Goal less than 130/80.  Nonpharmacological lifestyle modifications reviewed.\par 3.  Dyslipidemia.  Significantly elevated triglycerides in the past.    On statin therapy.  Goal would be less than 100 preferably less than 70.  Unable to tolerate higher dose.  Lifestyle modifications reviewed.  Follow liver function tests lipid panel.  Labs ordered.\par 4.  Intermittent dizziness.  Probably related to upper respiratory tract/sinuses.  Improved with Flonase.  Continue evaluation management with ENT.  No correlation with arrhythmias.  No orthostasis.  No significant vertebral disease noted.\par \par Counseling regarding low saturated fat, salt and carbohydrate intake was reviewed. Active lifestyle and regular. Exercise along with weight management is advised.\par All the above were at length reviewed. Answered all the questions. Thank you very much for this kind referral. Please do not hesitate to give me a call for any question.\par Part of this transcription was done with voice recognition software and phonetically similar errors are common. I apologize for that. Please donot hesitate to call for any questions due to above.\par \par Follow-up as advised\par \par Sincerely,\par Parul Jacobson MD,FACC,FASE\par

## 2022-12-19 NOTE — ASSESSMENT
[FreeTextEntry1] : EKG ordered and interpreted by me. Normal sinus rhythm.\par \par Reviewed on July 17, 2019\par EKG normal sinus rhythm. Incomplete right bundle branch block.\par Labs platelet count 120, hemoglobin 14.7, white count 5.9, sodium 140, potassium 4.2, creatinine 0.8, glucose 113 LFTs normal. Triglycerides 333, HDL 32, LDL 92, total cholesterol 191\par \par Reviewed on July 28, 2020\par Echocardiogram September 3, 2019 EF 55% minimal mitral regurgitation mean mitral gradient 4 mmHg mildly dilated left atrium pulmonary pressures normal\par Labs from July 21, 2020 CBC relatively stable creatinine 0.82 potassium 4.6 sodium 140 LFT normal  HDL 34 triglycerides 221 total cholesterol 179\par \par Reviewed on October 23, 2020.\par Echocardiogram reviewed\par Labs reviewed with normal lipoprotein a done on October 14, 2020.\par \par Reviewed September 22, 2021.\par Labs from August 11, 2021.  Sodium 140 potassium 4.1 creatinine 0.84.  Total cholesterol 180 triglycerides 128 HDL 38 .\par \par Reviewed on April 6, 2022.\par Labs from August 11, 2021 were again reviewed in relation to lipid panel.\par Echocardiogram done today for 6/20/2022 was reviewed.  It was read and interpreted by me.\par \par Reviewed on November 30, 2022\par EKG as noted above\par Recent labs August 24, 2022 stable CBC CMP total cholesterol 206 HDL 31 triglycerides elevated.\par \par Reviewed December 19, 2022\par Carotid Doppler study/event monitor reviewed

## 2022-12-19 NOTE — REASON FOR VISIT
[Structural Heart and Valve Disease] : structural heart and valve disease [Hyperlipidemia] : hyperlipidemia [Hypertension] : hypertension [FreeTextEntry3] : Dr. Cameron [FreeTextEntry1] : Consent: Patient consented to virtual video visit.  He is in [facility].  I am in [facility] office\par Time: A total of 22 minutes was spent in review of pertinent medical records, discussion with the patient, evaluation of the patient problem and coordination of the care plan as part of this online visit.\par \par No physical examination was performed as this visit was performed virtually with exceptions as noted below.\par \par 62-year-old gentleman was seen in audiovisual visit.  As you know he was recently seen in the office for consultation because of intermittent complain of lightheadedness feeling.  When he looks down and or gets up.  2 episodes where while sitting down eating dinner he felt lightheaded and sweaty to the left side.  He was seen by ENT and ophthalmologist and they did not find anything significant.  He has noticed some nasal congestion.  None of his symptoms are associated with exertion.  No associated palpitations.  No headache visual disturbances or focal weakness.  He is doing his regular exercises without any significant problems.  He denies any associated chest pain or shortness of breath.  And no syncopal event.\par His medical history includes\par \par Mitral valve repair for mitral regurgitation.  2010 mild residual mitral regurgitation/mild increase mitral valve gradient.\par And tricuspid valve repair.  2010\par Essential hypertension.  No CHF.  No CKD.  Non-smoker\par Hyperlipidemia.  On statin therapy\par \par Since above when seen today his dizziness has significantly gotten better with use of Flonase.  He was seen by ENT and is planned to have CT scan for further evaluation.\par Offers no complaint of exertional chest pain. No PND, orthopnea, or palpitations.  \par No pedal edema.\par Good exercise tolerance without any symptoms.\par Stable. Diet and activity.\par No claudication pain.\par No recent hospitalization.

## 2023-01-24 ENCOUNTER — NON-APPOINTMENT (OUTPATIENT)
Age: 63
End: 2023-01-24

## 2023-06-07 ENCOUNTER — APPOINTMENT (OUTPATIENT)
Dept: CARDIOLOGY | Facility: CLINIC | Age: 63
End: 2023-06-07
Payer: COMMERCIAL

## 2023-06-07 VITALS
WEIGHT: 210 LBS | BODY MASS INDEX: 26.95 KG/M2 | DIASTOLIC BLOOD PRESSURE: 78 MMHG | HEIGHT: 74 IN | SYSTOLIC BLOOD PRESSURE: 122 MMHG | OXYGEN SATURATION: 97 % | HEART RATE: 65 BPM

## 2023-06-07 PROCEDURE — 99214 OFFICE O/P EST MOD 30 MIN: CPT

## 2023-06-07 RX ORDER — FLUOROURACIL 50 MG/G
5 CREAM TOPICAL
Qty: 40 | Refills: 0 | Status: DISCONTINUED | COMMUNITY
Start: 2022-10-31 | End: 2023-06-07

## 2023-06-07 NOTE — CARDIOLOGY SUMMARY
[___] : [unfilled] [LVEF ___%] : LVEF [unfilled]% [None] : no pulmonary hypertension [Normal] : normal LA size [de-identified] : September 22, 2021.  Normal sinus rhythm\par November 30, 2022.  Normal sinus rhythm [de-identified] : Event monitor December 2022.  Extrasystoles.  Nonsustained arrhythmias. [de-identified] : April 6, 2022.\par LVEF 60% minimal mitral regurgitation annuloplasty mitral and tricuspid position.  Mean mitral gradient 5 mmHg.  Minimal aortic regurgitation.  Minimal tricuspid regurgitation RVSP 19.  Normal left and right atrial size. [de-identified] : Bilateral carotid Doppler study December 2022 no significant obstruction of carotid or vertebral spine

## 2023-06-07 NOTE — DISCUSSION/SUMMARY
[FreeTextEntry1] : 62-year-old gentleman with above medical history and active medical problems as noted below\par 1.  Mitral and tricuspid valve repair.  Minimal regurgitation noted.  Mild increase mitral gradient which is stable.  Preserved EF.  Normal chamber dimensions.  Clinically class I functional status.  Recommendation includes\par SBE prophylaxis\par Echocardiogram in 1 year\par Any change in clinical status which have been reviewed to notify us or call 911 in the form of shortness of breath, chest pain, near syncopal or syncopal event.\par 2.  Essential hypertension.  Has remained stable and well-controlled .  On medications.  Follow labs for potassium and creatinine closely with use of telmisartan.  Goal less than 130/80.  Nonpharmacological lifestyle modifications reviewed.\par 3.  Dyslipidemia.  Significantly elevated triglycerides in the past.    On statin therapy.  Goal would be less than 100 preferably less than 70.  Unable to tolerate higher dose.  Lifestyle modifications reviewed.  Follow liver function tests lipid panel.  Labs ordered.\par 4.  Intermittent dizziness.  Resolved at present.  Less likely to be cardiac in origin\par \par Counseling regarding low saturated fat, salt and carbohydrate intake was reviewed. Active lifestyle and regular. Exercise along with weight management is advised.\par All the above were at length reviewed. Answered all the questions. Thank you very much for this kind referral. Please do not hesitate to give me a call for any question.\par Part of this transcription was done with voice recognition software and phonetically similar errors are common. I apologize for that. Please donot hesitate to call for any questions due to above.\par \par Follow-up as advised\par \par Sincerely,\par Parul Jacobson MD,FACC,DEDE\par

## 2023-06-07 NOTE — REVIEW OF SYSTEMS
[Myalgia] : myalgia [Negative] : Heme/Lymph [FreeTextEntry4] : As per HPI [de-identified] : As per HPI

## 2023-06-07 NOTE — ASSESSMENT
[FreeTextEntry1] : Reviewed June 7, 2023.\par Labs from April 8, 2023 stable CBC sodium 137 potassium 4.5 creatinine 0.75 hemoglobin A1c 5.7.  LDL 94 HDL 37 triglycerides 108.

## 2023-06-07 NOTE — REASON FOR VISIT
[Structural Heart and Valve Disease] : structural heart and valve disease [Hyperlipidemia] : hyperlipidemia [Hypertension] : hypertension [FreeTextEntry3] : Dr. Cameron [FreeTextEntry1] : 62-year-old gentleman is seen in the office for follow-up consultation in presence of mitral and tricuspid valve repair.  Since last seen his dizziness has resolved.  He has been evaluated by ENT and neurologist.\par We have done an echocardiogram and event monitor without any cardiovascular etiology.\par   He is doing his regular exercises without any significant problems.  He denies any associated chest pain or shortness of breath.  And no syncopal event.\par His medical history includes\par \par Mitral valve repair for mitral regurgitation.  2010 mild residual mitral regurgitation/mild increase mitral valve gradient.\par And tricuspid valve repair.  2010\par Essential hypertension.  No CHF.  No CKD.  Non-smoker\par Hyperlipidemia.  On statin therapy\par \par

## 2024-01-10 ENCOUNTER — NON-APPOINTMENT (OUTPATIENT)
Age: 64
End: 2024-01-10

## 2024-01-10 ENCOUNTER — APPOINTMENT (OUTPATIENT)
Dept: CARDIOLOGY | Facility: CLINIC | Age: 64
End: 2024-01-10
Payer: COMMERCIAL

## 2024-01-10 VITALS
BODY MASS INDEX: 27.6 KG/M2 | SYSTOLIC BLOOD PRESSURE: 114 MMHG | DIASTOLIC BLOOD PRESSURE: 80 MMHG | WEIGHT: 215 LBS | HEART RATE: 72 BPM | OXYGEN SATURATION: 93 %

## 2024-01-10 DIAGNOSIS — R00.2 PALPITATIONS: ICD-10-CM

## 2024-01-10 DIAGNOSIS — E78.00 PURE HYPERCHOLESTEROLEMIA, UNSPECIFIED: ICD-10-CM

## 2024-01-10 PROCEDURE — 93000 ELECTROCARDIOGRAM COMPLETE: CPT

## 2024-01-10 PROCEDURE — 99214 OFFICE O/P EST MOD 30 MIN: CPT | Mod: 25

## 2024-01-10 RX ORDER — TELMISARTAN 20 MG/1
20 TABLET ORAL
Qty: 90 | Refills: 3 | Status: DISCONTINUED | COMMUNITY
Start: 2018-08-06 | End: 2024-01-10

## 2024-01-10 RX ORDER — FLUTICASONE FUROATE 27.5 UG/1
27.5 SPRAY, METERED NASAL DAILY
Qty: 1 | Refills: 1 | Status: DISCONTINUED | COMMUNITY
Start: 2022-11-30 | End: 2024-01-10

## 2024-01-11 NOTE — HISTORY OF PRESENT ILLNESS
[FreeTextEntry1] : Patient is a very pleasant 63-year-old male with a longstanding history of mitral regurgitation followed here on an ongoing basis by Dr. Jacobson.  Patient was recommended for progressive mitral [place] [place] with deteriorating LV function to have mitral valve repair which occurred in 2010.  He sought several consultations but went to [place] and Dr. Contreras did the repair.  Patient had an uneventful postoperative course and has been followed here since that time.  His last echocardiogram was in April 2022 that was notable for an annuloplasty ring seen in the mitral position with minimal mitral regurgitation mean transmitral valve gradient of 5 which was considered normal for this ring.  There was thickened aortic valve with minimal aortic regurgitation.  Normal left ventricular systolic function with no regional wall motion abnormalities and ejection fraction of 60.  There was evidence of a tricuspid valve repair with minimal TR normal PA pressures of 19 and minimal pulmonic regurgitation with a normal-appearing pulmonic valve.  Patient notes that he was well until the day before knee received.  There was no provocative events.  He does drink coffee but there was no change in the amount, no excessive alcohol and he describes his symptoms as isolated and short runs lasting less than 5 seconds without any associated dizziness or lightheadedness.  Patient is chronically on metoprolol XL 25 mg and losartan 80 mg/day.  [facility] and notes that his wife just tested positive for COVID although he has not.  He notes that these episodes are intermittent, they occur on a daily basis, he has no chest pain, there is been no change in diet, caffeine ingestion, or other stimulants.  Patient has been evaluated with Zio patch in the past and had 7 beats of SVT that he was asymptomatic of otherwise his Zio patch monitor was essentially unremarkable, that study was performed in November 2022.  Patient presents today for evaluation and further recommendations appearing well but concerned

## 2024-01-11 NOTE — CARDIOLOGY SUMMARY
[de-identified] : (1/10/2024) EKG: NSR at 72 bpm with a frontal QRS axis of 60 degrees.  Normal P wave morphology remainder the tracing within normal limits [de-identified] :  ECHOCARDIOGRAM: April 2022 that was notable for an annuloplasty ring seen in the mitral position with minimal mitral regurgitation mean transmitral valve gradient of 5 which was considered normal for this ring.  There was thickened aortic valve with minimal aortic regurgitation.  Normal left ventricular systolic function with no regional wall motion abnormalities and ejection fraction of 60.  There was evidence of a tricuspid valve repair with minimal TR normal PA pressures of 19 and minimal pulmonic regurgitation with a normal-appearing pulmonic valve.

## 2024-01-11 NOTE — REASON FOR VISIT
[Structural Heart and Valve Disease] : structural heart and valve disease [Arrhythmia/ECG Abnorrmalities] : arrhythmia/ECG abnormalities [FreeTextEntry3] : Dr. Faust [FreeTextEntry1] : Patient is a 63-year-old white male who presents to the office today for complaints of palpitations over the last 2 weeks.  Patient has known significant prior cardiac history having had a mitral valve repair and tricuspid valve annuloplasty for chronic mitral regurgitation from prolapse by Dr. Contreras at [place] in 2010 who presents today for evaluation of recent change in symptomatology.

## 2024-01-11 NOTE — HISTORY OF PRESENT ILLNESS
[FreeTextEntry1] : Patient is a very pleasant 63-year-old male with a longstanding history of mitral regurgitation followed here on an ongoing basis by Dr. Jacobson.  Patient was recommended for progressive mitral Goethe Tatian with deteriorating LV function to have mitral valve repair which occurred in 2010.  He sought several consultations but went to [place] and Dr. Contreras did the repair.  Patient had an uneventful postoperative course and has been followed here since that time.  His last echocardiogram was in April 2022 that was notable for an annuloplasty ring seen in the mitral position with minimal mitral regurgitation mean transmitral valve gradient of 5 which was considered normal for this ring.  There was thickened aortic valve with minimal aortic regurgitation.  Normal left ventricular systolic function with no regional wall motion abnormalities and ejection fraction of 60.  There was evidence of a tricuspid valve repair with minimal TR normal PA pressures of 19 and minimal pulmonic regurgitation with a normal-appearing pulmonic valve.  Patient notes that he was well until the day before knee received.  There was no provocative events.  He does drink coffee but there was no change in the amount, no excessive alcohol and he describes his symptoms as isolated and short runs lasting less than 5 seconds without any associated dizziness or lightheadedness.  Patient is chronically on metoprolol XL 25 mg and losartan 80 mg/day.  [facility] and notes that his wife just tested positive for COVID although he has not.  He notes that these episodes are intermittent, they occur on a daily basis, he has no chest pain, there is been no change in diet, caffeine ingestion, or other stimulants.  Patient has been evaluated with Zio patch in the past and had 7 beats of SVT that he was asymptomatic of otherwise his Zio patch monitor was essentially unremarkable, that study was performed in November 2022.  Patient presents today for evaluation and further recommendations appearing well but concerned

## 2024-01-11 NOTE — REASON FOR VISIT
[Structural Heart and Valve Disease] : structural heart and valve disease [Arrhythmia/ECG Abnorrmalities] : arrhythmia/ECG abnormalities [FreeTextEntry3] : Dr. Faust [FreeTextEntry1] : Patient is a 63-year-old white male who presents to the office today for complaints of palpitations over the last 2 weeks.  Patient has known significant prior cardiac history having had a mitral valve repair and tricuspid valve annuloplasty for chronic mitral regurgitation from prolapse by Dr. Contreras at [place] in 2010 who presents today for evaluation of recent change in symptomatology.

## 2024-01-11 NOTE — PHYSICAL EXAM
[Well Developed] : well developed [Well Nourished] : well nourished [No Acute Distress] : no acute distress [Normal S1, S2] : normal S1, S2 [No Rub] : no rub [No Gallop] : no gallop [Clear Lung Fields] : clear lung fields [Good Air Entry] : good air entry [No Respiratory Distress] : no respiratory distress  [Normal] : normal gait [No Edema] : no edema [No Cyanosis] : no cyanosis [No Clubbing] : no clubbing [de-identified] : Grade 1/6 basal aortic flow murmur no gallops rubs or clicks appreciated

## 2024-01-11 NOTE — REVIEW OF SYSTEMS
[SOB] : no shortness of breath [Dyspnea on exertion] : not dyspnea during exertion [Chest Discomfort] : no chest discomfort [Lower Ext Edema] : no extremity edema [Leg Claudication] : no intermittent leg claudication [Palpitations] : palpitations [Orthopnea] : no orthopnea [PND] : no PND [Syncope] : no syncope [Negative] : Neurological

## 2024-01-11 NOTE — DISCUSSION/SUMMARY
[FreeTextEntry1] : Patient is a very pleasant 63-year-old male with remote history of having had a mitral valve repair for mitral valve prolapse back in 2010 with Dr. Contreras at [place].  Last year's echo revealed structural integrity without any significant valvular dysfunction or chamber enlargement.  Patient presents today with palpitations that began approximately 12 days ago.  There has been no change in dietary behavior, alcohol consumption or stimulant use.  He presents today describing isolated and short-lived episodes lasting less than 5 seconds of rapid heartbeats not associated with any hemodynamic compromise.  Patient's previous evaluations were notable for 7 beats of NSVT last year, he was due for an echocardiogram, has known hypertension and hyperlipidemia remains on metoprolol and valsartan.  Previous echocardiography revealed normal LV function, normal LA size mild aortic insufficiency.  And normal functioning mitral valve prosthesis with a small gradient of 5.  Based on patient's physical exam, interval cardiac review of systems review of previous structural evaluations and monitoring and current EKG I was reassuring to the patient that it is highly unlikely that there ihas been any significant change in his valvular function to account for this ectopy.  A live 5-day Zio patch was applied, he was recommended to supplement his diet with magnesium.  A follow-up echocardiogram was scheduled and he was reassured based on exam that it is unlikely there is been a significant change in his valvular function.  No additional changes were made to his medical regimen other than addition of magnesium.  He will return in 3 weeks for reevaluation, updated echocardiogram and will be in touch with him regarding any monitored findings.  Joel Goldberg, MD, FACC [EKG obtained to assist in diagnosis and management of assessed problem(s)] : EKG obtained to assist in diagnosis and management of assessed problem(s)

## 2024-01-11 NOTE — DISCUSSION/SUMMARY
[FreeTextEntry1] : Patient is a very pleasant 63-year-old male with remote history of having had a mitral valve repair for mitral valve prolapse back in 2010 with Dr. Contreras at [place].  Last year's echo revealed structural integrity without any significant valvular dysfunction or chamber enlargement.  Patient presents today with palpitations that began approximately 12 days ago.  There has been no change in dietary behavior, alcohol consumption or stimulant use.  He presents today describing isolated and short-lived episodes lasting less than 5 seconds of rapid heartbeats not associated with any hemodynamic compromise.  Patient's previous evaluations were notable for 7 beats of NSVT last year, he was due for an echocardiogram, has known hypertension and hyperlipidemia remains on metoprolol and valsartan.  Previous echocardiography revealed normal LV function, normal LA size mild aortic insufficiency.  And normal functioning mitral valve prosthesis with a small gradient of 5.  Based on patient's physical exam, interval cardiac review of systems review of previous structural evaluations and monitoring and current EKG I was reassuring to the patient that it is highly unlikely that there ihas been any significant change in his valvular function to account for this ectopy.  A live 5-day Zio patch was applied, he was recommended to supplement his diet with magnesium.  A follow-up echocardiogram was scheduled and he was reassured based on exam that it is unlikely there is been a significant change in his valvular function.  No additional changes were made to his medical regimen other than addition of magnesium.  He will return in 3 weeks for reevaluation, updated echocardiogram and will be in touch with him regarding any monitored findings.  Joel Goldberg, MD, FACC [EKG obtained to assist in diagnosis and management of assessed problem(s)] : EKG obtained to assist in diagnosis and management of assessed problem(s)

## 2024-01-11 NOTE — PHYSICAL EXAM
[Well Developed] : well developed [Well Nourished] : well nourished [No Acute Distress] : no acute distress [Normal S1, S2] : normal S1, S2 [No Rub] : no rub [No Gallop] : no gallop [Clear Lung Fields] : clear lung fields [Good Air Entry] : good air entry [No Respiratory Distress] : no respiratory distress  [Normal] : normal gait [No Edema] : no edema [No Cyanosis] : no cyanosis [No Clubbing] : no clubbing [de-identified] : Grade 1/6 basal aortic flow murmur no gallops rubs or clicks appreciated

## 2024-01-11 NOTE — CARDIOLOGY SUMMARY
[de-identified] : (1/10/2024) EKG: NSR at 72 bpm with a frontal QRS axis of 60 degrees.  Normal P wave morphology remainder the tracing within normal limits [de-identified] :  ECHOCARDIOGRAM: April 2022 that was notable for an annuloplasty ring seen in the mitral position with minimal mitral regurgitation mean transmitral valve gradient of 5 which was considered normal for this ring.  There was thickened aortic valve with minimal aortic regurgitation.  Normal left ventricular systolic function with no regional wall motion abnormalities and ejection fraction of 60.  There was evidence of a tricuspid valve repair with minimal TR normal PA pressures of 19 and minimal pulmonic regurgitation with a normal-appearing pulmonic valve.

## 2024-01-31 ENCOUNTER — APPOINTMENT (OUTPATIENT)
Dept: CARDIOLOGY | Facility: CLINIC | Age: 64
End: 2024-01-31
Payer: COMMERCIAL

## 2024-01-31 PROCEDURE — 93306 TTE W/DOPPLER COMPLETE: CPT

## 2024-02-07 ENCOUNTER — APPOINTMENT (OUTPATIENT)
Dept: CARDIOLOGY | Facility: CLINIC | Age: 64
End: 2024-02-07
Payer: COMMERCIAL

## 2024-02-07 ENCOUNTER — NON-APPOINTMENT (OUTPATIENT)
Age: 64
End: 2024-02-07

## 2024-02-07 VITALS
WEIGHT: 220 LBS | HEART RATE: 64 BPM | DIASTOLIC BLOOD PRESSURE: 70 MMHG | OXYGEN SATURATION: 95 % | HEIGHT: 74 IN | BODY MASS INDEX: 28.23 KG/M2 | SYSTOLIC BLOOD PRESSURE: 130 MMHG

## 2024-02-07 DIAGNOSIS — I34.0 NONRHEUMATIC MITRAL (VALVE) INSUFFICIENCY: ICD-10-CM

## 2024-02-07 PROCEDURE — 99215 OFFICE O/P EST HI 40 MIN: CPT

## 2024-02-07 PROCEDURE — 93000 ELECTROCARDIOGRAM COMPLETE: CPT

## 2024-02-07 PROCEDURE — G2211 COMPLEX E/M VISIT ADD ON: CPT

## 2024-02-07 PROCEDURE — 99214 OFFICE O/P EST MOD 30 MIN: CPT

## 2024-02-07 RX ORDER — ATORVASTATIN CALCIUM 10 MG/1
10 TABLET, FILM COATED ORAL DAILY
Refills: 0 | Status: ACTIVE | COMMUNITY

## 2024-02-07 RX ORDER — ASPIRIN 81 MG
81 TABLET, DELAYED RELEASE (ENTERIC COATED) ORAL DAILY
Refills: 0 | Status: ACTIVE | COMMUNITY

## 2024-02-07 RX ORDER — VALSARTAN 80 MG/1
80 TABLET, COATED ORAL DAILY
Qty: 90 | Refills: 3 | Status: ACTIVE | COMMUNITY

## 2024-02-07 NOTE — CARDIOLOGY SUMMARY
[___] : [unfilled] [LVEF ___%] : LVEF [unfilled]% [___] : [unfilled] [None] : no pulmonary hypertension [Normal] : normal LA size [de-identified] : September 22, 2021.  Normal sinus rhythm November 30, 2022.  Normal sinus rhythm February 7, 2024.  Normal sinus rhythm.  Normal intervals. [de-identified] : Event monitor December 2022.  Extrasystoles.  Nonsustained arrhythmias. [de-identified] : April 6, 2022. LVEF 60% minimal mitral regurgitation annuloplasty mitral and tricuspid position.  Mean mitral gradient 5 mmHg.  Minimal aortic regurgitation.  Minimal tricuspid regurgitation RVSP 19.  Normal left and right atrial size. January 2024.  Preserved EF.  Stable annuloplasty of mitral and tricuspid position.  No significant pericardial effusion. [de-identified] : Bilateral carotid Doppler study December 2022 no significant obstruction of carotid or vertebral spine

## 2024-02-07 NOTE — REASON FOR VISIT
[Structural Heart and Valve Disease] : structural heart and valve disease [Hyperlipidemia] : hyperlipidemia [Hypertension] : hypertension [FreeTextEntry3] : Dr. Cameron [FreeTextEntry1] : 62-year-old gentleman is seen in the office for follow-up consultationIn presence of recent complaint of palpitation, near syncopal events multiple times.  Was seen by Dr. Goldberg.  Had 3-day event monitor which did not show any significant arrhythmia except very brief episodes of PSVT.  According to him he did not have any significant symptoms during that time.  He had an echocardiogram which showed preserved EF and stable valve repairs. He has been taking Prilosec without any significant improvement in his symptoms. His has no increase in alcohol or caffeine intake.  He has occasional use of sildenafil.  No significant correlation with dose. He has been hydrating himself well. Most of the symptoms last for minutes.  But last Sunday lasted for about 4 hours. There is no associated PND orthopnea.  Does have uncomfortable feeling in the left precordial region at the time along with the fluttering sensation.  He has no headache visual disturbances focal weakness.  He has seen neurologist in the past with negative workup. He has seen ophthalmologist. He has no other over-the-counter medication use.  No sleeping aids.  As you know I been seeing him in presence of mitral and tricuspid valve repair.  He has had dizziness in the past which was evaluated by ENT neurologist without any significant etiology. He is doing his regular exercises without any significant problems.  He denies any associated chest pain or shortness of breath.  And no syncopal event.  His medical history includes  Mitral valve repair for mitral regurgitation.  2010 mild residual mitral regurgitation/mild increase mitral valve gradient. And tricuspid valve repair.  2010 Essential hypertension.  No CHF.  No CKD.  Non-smoker on valsartan 80 mg.  Metoprolol 25 mg. Hyperlipidemia.  On statin therapy

## 2024-02-07 NOTE — DISCUSSION/SUMMARY
[FreeTextEntry1] : 63-year-old gentleman with above medical history and active medical problems as noted below 1.  Mitral and tricuspid valve repair.  Minimal regurgitation noted.  Mild increase mitral gradient which is stable.  Preserved EF.  Normal chamber dimensions.  Clinically class I functional status.  Recommendation includes SBE prophylaxis Any change in clinical status which have been reviewed to notify us or call 911 in the form of shortness of breath, chest pain, near syncopal or syncopal event. 2.  Essential hypertension.  Has remained stable and well-controlled .  No orthostatic changes. Recommended to take valsartan at nighttime along with metoprolol in presence of recurrent intermittent dizziness.  Nonpharmacological lifestyle modifications reviewed. 3.  Dyslipidemia.  Significantly elevated triglycerides in the past.    On statin therapy.  Goal would be less than 100 preferably less than 70.  Unable to tolerate higher dose.  Lifestyle modifications reviewed.  Follow liver function tests lipid panel.  Labs ordered. 4.  Intermittent dizziness.  Near syncopal event.  Unclear etiology.  Brief PSVT less likely to account for it.  Considering symptoms of uncomfortable feeling palpitation and EKG at baseline without significant QT prolongation I recommended him to have extended event monitor and exercise treadmill stress test.  This is to rule out any correlation with significant arrhythmias.  I have also recommended him to hydrate himself.  Take his valsartan at nighttime.  And if there is no correlation with arrhythmias may need to consider revisit with ENT/neurologist and adjustment of antihypertensive therapy.  I have also recommended him to have labs which includes metanephrine and catecholamine level to rule out any other neuroendocrine secreting condition. Again tried to reassure him regarding the present finding.  If anything significantly worse he is recommended to call 911 and go to the nearest emergency room.  Counseling regarding low saturated fat, salt and carbohydrate intake was reviewed. Active lifestyle and regular. Exercise along with weight management is advised. All the above were at length reviewed. Answered all the questions. Thank you very much for this kind referral. Please do not hesitate to give me a call for any question. Part of this transcription was done with voice recognition software and phonetically similar errors are common. I apologize for that. Please donot hesitate to call for any questions due to above.  Follow-up as advised  Sincerely, Parul Jacobson MD,FACC,[facility]

## 2024-02-07 NOTE — REVIEW OF SYSTEMS
[Myalgia] : myalgia [Negative] : Heme/Lymph [FreeTextEntry4] : As per HPI [de-identified] : As per HPI

## 2024-02-07 NOTE — ASSESSMENT
[FreeTextEntry1] : Reviewed June 7, 2023. Labs from April 8, 2023 stable CBC sodium 137 potassium 4.5 creatinine 0.75 hemoglobin A1c 5.7.  LDL 94 HDL 37 triglycerides 108.  Reviewed on February 7, 2024. EKG as noted above Echocardiogram and event monitor as noted above Labs from 12/21/2023 CBC CMP stable hemoglobin A1c 5.6 TSH normal vitamin B12 folate acid level normal HDL 40 triglycerides 113 LDL is 113.

## 2024-02-28 ENCOUNTER — APPOINTMENT (OUTPATIENT)
Dept: CARDIOLOGY | Facility: CLINIC | Age: 64
End: 2024-02-28
Payer: COMMERCIAL

## 2024-02-28 DIAGNOSIS — R00.2 PALPITATIONS: ICD-10-CM

## 2024-02-28 PROCEDURE — 93015 CV STRESS TEST SUPVJ I&R: CPT

## 2024-03-05 ENCOUNTER — APPOINTMENT (OUTPATIENT)
Dept: CARDIOLOGY | Facility: CLINIC | Age: 64
End: 2024-03-05
Payer: COMMERCIAL

## 2024-03-05 VITALS
WEIGHT: 218 LBS | OXYGEN SATURATION: 97 % | HEART RATE: 74 BPM | BODY MASS INDEX: 27.98 KG/M2 | SYSTOLIC BLOOD PRESSURE: 118 MMHG | HEIGHT: 74 IN | DIASTOLIC BLOOD PRESSURE: 62 MMHG

## 2024-03-05 DIAGNOSIS — R00.2 PALPITATIONS: ICD-10-CM

## 2024-03-05 DIAGNOSIS — R55 SYNCOPE AND COLLAPSE: ICD-10-CM

## 2024-03-05 PROCEDURE — 99214 OFFICE O/P EST MOD 30 MIN: CPT

## 2024-03-05 PROCEDURE — 99215 OFFICE O/P EST HI 40 MIN: CPT

## 2024-03-05 PROCEDURE — G2211 COMPLEX E/M VISIT ADD ON: CPT

## 2024-03-05 NOTE — ASSESSMENT
[FreeTextEntry1] : Reviewed June 7, 2023. Labs from April 8, 2023 stable CBC sodium 137 potassium 4.5 creatinine 0.75 hemoglobin A1c 5.7.  LDL 94 HDL 37 triglycerides 108.  Reviewed on February 7, 2024. EKG as noted above Echocardiogram and event monitor as noted above Labs from 12/21/2023 CBC CMP stable hemoglobin A1c 5.6 TSH normal vitamin B12 folate acid level normal HDL 40 triglycerides 113 LDL is 113.  Reviewed on March 5, 2024. Event monitorShowed brief nonsustained episodes of SVT and NSVT.  Associated with palpitations.  No near syncopal or syncopal event. Echocardiogram has shown preserved EF. Exercise treadmill stress test was nonischemic.  No significant exercise related arrhythmias. Labs that show normal metanephrine level.

## 2024-03-05 NOTE — REASON FOR VISIT
[Structural Heart and Valve Disease] : structural heart and valve disease [Hyperlipidemia] : hyperlipidemia [Hypertension] : hypertension [FreeTextEntry3] : Dr. Cameron [FreeTextEntry1] : 63-year-old gentleman is seen in the office for follow-up consultationIn presence of recent complaint of palpitation, near syncopal events multiple times.  Was seen by Dr. Goldberg.  Had 3-day event monitor which did not show any significant arrhythmia except very brief episodes of PSVT.  According to him he did not have any significant symptoms during that time.  He had an echocardiogram which showed preserved EF and stable valve repairs.  Now he is here to review his exercise treadmill stress test, echocardiograms, event monitoring which has shown NSVT about 7 beats. He has been taking Prilosec without any significant improvement in his symptoms. His has no increase in alcohol or caffeine intake.  He has occasional use of sildenafil.  No significant correlation with dose. He has been hydrating himself well. Most of the symptoms last for minutes.  But last Sunday lasted for about 4 hours. There is no associated PND orthopnea.  Does have uncomfortable feeling in the left precordial region at the time along with the fluttering sensation.  He has no headache visual disturbances focal weakness.  He has seen neurologist in the past with negative workup. He has seen ophthalmologist. He has no other over-the-counter medication use.  No sleeping aids.  As you know I been seeing him in presence of mitral and tricuspid valve repair.  He has had dizziness in the past which was evaluated by ENT neurologist without any significant etiology. He is doing his regular exercises without any significant problems.  He denies any associated chest pain or shortness of breath.  And no syncopal event.  His medical history includes  Mitral valve repair for mitral regurgitation.  2010 mild residual mitral regurgitation/mild increase mitral valve gradient. And tricuspid valve repair.  2010 Essential hypertension.  No CHF.  No CKD.  Non-smoker on valsartan 80 mg.  Metoprolol 25 mg. Hyperlipidemia.  On statin therapy

## 2024-03-05 NOTE — DISCUSSION/SUMMARY
[FreeTextEntry1] : 63-year-old gentleman with above medical history and active medical problems as noted below 1.  Mitral and tricuspid valve repair.  Minimal regurgitation noted.  Mild increase mitral gradient which is stable.  Preserved EF.  Normal chamber dimensions.  Clinically class I functional status.  Recommendation includes SBE prophylaxis Any change in clinical status which have been reviewed to notify us or call 911 in the form of shortness of breath, chest pain, near syncopal or syncopal event. 2.  Essential hypertension.  Has remained stable and well-controlled .  No orthostatic changes. Recommended to take valsartan at nighttime along with metoprolol in presence of recurrent intermittent dizziness.  Nonpharmacological lifestyle modifications reviewed. 3.  Dyslipidemia.  Significantly elevated triglycerides in the past.    On statin therapy.  Goal would be less than 100 preferably less than 70.  Unable to tolerate higher dose.  Lifestyle modifications reviewed.  Follow liver function tests lipid panel.  Labs ordered. 4.  Intermittent dizziness.  Near syncopal event.  Unclear etiology.  NSVT/PSVT.  Brief episodes not associated with any symptoms of dizziness, near syncopal or syncopal event besides palpitations.  He has preserved EF.  An exercise treadmill stress test nonischemic with no stress-induced arrhythmias. He does have a family history with brother having arrhythmias (2 brothers with A-fib, 1 brother mitral valve repair and pacemaker/not clear whether he has AICD ) requiring EP consultation and management. Considering his family history and symptomatic arrhythmias I recommended him to have cardiac MRI rule out myocardial disease.  EP consultation recommended for further evaluation management.  And as needed further ischemic evaluation with coronary CTA can be considered. Continue hydration. Reviewed limitations and not to do aggressive exercises which can give him higher risk symptoms. He can continue to be active and lower level of exercise on his bicycle as long as no significant symptoms associated with that. We reviewed most likely scenario and at his request we have also discussed risk of syncope associated morbidity mortality and sudden cardiac death.  Understands preserved EF and nonischemic stress test puts him at lower probability of high risk cardiovascular events.  None of the last needs further evaluation management Again tried to reassure him regarding the present finding.  If anything significantly worse he is recommended to call 911 and go to the nearest emergency room. I have also asked him to increase his metoprolol to 37.5 mg daily.  Did not increase to 50 mg because of lower mean heart rate.   Counseling regarding low saturated fat, salt and carbohydrate intake was reviewed. Active lifestyle and regular. Exercise along with weight management is advised. All the above were at length reviewed. Answered all the questions. Thank you very much for this kind referral. Please do not hesitate to give me a call for any question. Part of this transcription was done with voice recognition software and phonetically similar errors are common. I apologize for that. Please donot hesitate to call for any questions due to above.  Follow-up as advised  Sincerely, Parul Jacobson MD,FACC,[facility]

## 2024-03-05 NOTE — CARDIOLOGY SUMMARY
[___] : [unfilled] [LVEF ___%] : LVEF [unfilled]% [___] : [unfilled] [Normal] : normal LA size [None] : no pulmonary hypertension [de-identified] : September 22, 2021.  Normal sinus rhythm November 30, 2022.  Normal sinus rhythm February 7, 2024.  Normal sinus rhythm.  Normal intervals. [de-identified] : Event monitor December 2022.  Extrasystoles.  Nonsustained arrhythmias. Event monitor.  14 days.  2024.  Normal sinus rhythm.  45 -139.  Average 70.  PSVT.  NSVT.  Brief.  No syncope or near syncope.  [de-identified] : April 6, 2022. LVEF 60% minimal mitral regurgitation annuloplasty mitral and tricuspid position.  Mean mitral gradient 5 mmHg.  Minimal aortic regurgitation.  Minimal tricuspid regurgitation RVSP 19.  Normal left and right atrial size. January 2024.  Preserved EF.  Stable annuloplasty of mitral and tricuspid position.  No significant pericardial effusion. [de-identified] : Bilateral carotid Doppler study December 2022 no significant obstruction of carotid or vertebral spine

## 2024-03-08 ENCOUNTER — APPOINTMENT (OUTPATIENT)
Dept: ELECTROPHYSIOLOGY | Facility: CLINIC | Age: 64
End: 2024-03-08
Payer: COMMERCIAL

## 2024-03-08 DIAGNOSIS — I47.10 SUPRAVENTRICULAR TACHYCARDIA, UNSPECIFIED: ICD-10-CM

## 2024-03-08 PROCEDURE — 93000 ELECTROCARDIOGRAM COMPLETE: CPT

## 2024-03-08 PROCEDURE — 99204 OFFICE O/P NEW MOD 45 MIN: CPT | Mod: 25

## 2024-03-23 ENCOUNTER — NON-APPOINTMENT (OUTPATIENT)
Age: 64
End: 2024-03-23

## 2024-03-23 PROBLEM — I47.10 PSVT (PAROXYSMAL SUPRAVENTRICULAR TACHYCARDIA): Status: ACTIVE | Noted: 2024-02-07

## 2024-03-23 NOTE — PHYSICAL EXAM
[No Acute Distress] : no acute distress [Normal Venous Pressure] : normal venous pressure [Normal S1, S2] : normal S1, S2 [Non Tender] : non-tender [Clear Lung Fields] : clear lung fields [No Edema] : no edema

## 2024-03-23 NOTE — REVIEW OF SYSTEMS
[SOB] : no shortness of breath [Dyspnea on exertion] : not dyspnea during exertion [Feeling Fatigued] : not feeling fatigued [Palpitations] : no palpitations [Cough] : no cough [Chest Discomfort] : no chest discomfort [Joint Pain] : no joint pain [Abdominal Pain] : no abdominal pain [de-identified] : see HPI; no ongoing dizziness

## 2024-03-23 NOTE — HISTORY OF PRESENT ILLNESS
[FreeTextEntry1] : Patient is a 61-year-old man who is seen in evaluation regarding previous palpitations, near-syncope.  He had a 3-day Zio patch monitor that showed:  He had an echocardiogram which showed preserved left ventricular function he is status post previous mitral valve repair.  He hadtreadmill test:  Event monitor: 7 beats NSVT.  Prior mitral valve and tricuspid valve repair 2010.  Patient had a transthoracic echo on 1/31/2024 that showed EF 60%.  Trace aortic regurgitation.  The annuloplasty ring and trace regurgitation.  There is now annuloplasty ring also in the tricuspid position.  There is no pericardial effusion.  Compared with previous echo from 4/6/2022 there was no significant changes.  Patient had a Zio patch monitor for 13 days starting 2/7/20 24: It showed 7 beats of a wide-complex tachycardia that is flipped and axis suggestive of ventricular.  He also had short runs of an atrial tach 5 beats so you have had some  Symptoms: The patient had symptoms of dizziness in January 2024.  Prior to that he had it more than a year ago which was felt to possibly be middle ear infection.  Since January he has not had a recurrence of his dizziness.  It is about 2 days ago he had a brief episode of lightheadedness that occurred in the setting where he got up very quickly it resolved very quickly.  The patient had exercise stress test performed on 2/20/2024.  He had no exercise-induced arrhythmia.  He exercised to stage III 7 minutes for 4-second duration 9 minutes.

## 2024-03-23 NOTE — DISCUSSION/SUMMARY
[FreeTextEntry1] : This is a patient who has had prior dizziness but not syncope.  He is also had lightheadedness that is related to changing position quickly that may be volume related.  His monitor was significant for an episode of NSVT.  He had prior mitral valve as well as tricuspid valve repair/anoplasty ring.  His EF is now preserved.  The concern is whether any of the symptoms of dizziness that he had is making arrhythmia.  Even though he has preserved left ventricular function he may have scar that could predispose him to ventricular arrhythmia.  My recommendation would be for us to consider electrophysiologic testing and possibly an implantable loop to further monitor his symptoms.  The patient is also getting a cardiac MRI scan  I recommend he have EP  study to further assess. Limitations of the EP study discussed  He will call back to office to discuss and let us know. [EKG obtained to assist in diagnosis and management of assessed problem(s)] : EKG obtained to assist in diagnosis and management of assessed problem(s)

## 2024-04-08 NOTE — REVIEW OF SYSTEMS
[Myalgia] : myalgia [Negative] : Psychiatric [FreeTextEntry4] : As per HPI [de-identified] : As per HPI Statement Selected yes

## 2024-04-11 ENCOUNTER — APPOINTMENT (OUTPATIENT)
Dept: ORTHOPEDIC SURGERY | Facility: CLINIC | Age: 64
End: 2024-04-11
Payer: COMMERCIAL

## 2024-04-11 DIAGNOSIS — M75.42 IMPINGEMENT SYNDROME OF LEFT SHOULDER: ICD-10-CM

## 2024-04-11 DIAGNOSIS — M19.049 PRIMARY OSTEOARTHRITIS, UNSPECIFIED HAND: ICD-10-CM

## 2024-04-11 PROCEDURE — 73030 X-RAY EXAM OF SHOULDER: CPT | Mod: LT

## 2024-04-11 PROCEDURE — 99212 OFFICE O/P EST SF 10 MIN: CPT | Mod: 25

## 2024-04-11 PROCEDURE — 73140 X-RAY EXAM OF FINGER(S): CPT | Mod: RT

## 2024-04-11 RX ORDER — METOPROLOL SUCCINATE 25 MG/1
25 TABLET, EXTENDED RELEASE ORAL DAILY
Qty: 135 | Refills: 1 | Status: DISCONTINUED | COMMUNITY
End: 2024-04-11

## 2024-04-11 NOTE — HISTORY OF PRESENT ILLNESS
[de-identified] : Patient reports LT sided posterior shoulder and scapular pain. The pain began about 1 month ago, NKI. He takes Advil for the pain.

## 2024-04-11 NOTE — PHYSICAL EXAM
[Normal Coordination] : normal coordination [Normal Sensation] : normal sensation [Normal Mood and Affect] : normal mood and affect [Oriented] : oriented [Able to Communicate] : able to communicate [Well Developed] : well developed [Well Nourished] : well nourished [Left] : left shoulder [NL (45)] : right lateral flexion 45 degrees [NL (80)] : right lateral rotation 80 degrees [Biceps 2+] : biceps 2+ [Triceps 2+] : triceps 2+ [Brachioradialis 2+] : brachioradialis 2+ [NL (0-180)] : full active abduction 0-180 degrees [NL (0-70)] : full internal rotation 0-70 degrees [NL (0-90)] : full external rotation 0-90 degrees [2nd] : 2nd [Middle Phalanx] : middle phalanx [NL (75)] : Dorsiflexion 75 degrees [NL (85)] : volarflexion 85 degrees [NL (25)] : radial deviation 25 degrees [NL (40)] : ulnar deviation 40 degrees [NL (90)] : supination 90 degrees [5___] : pinch 5[unfilled]/5 [] : good capillary refill in all fingers [Right] : right fingers [There are no fractures, subluxations or dislocations. No significant abnormalities are seen] : There are no fractures, subluxations or dislocations. No significant abnormalities are seen

## 2024-04-15 ENCOUNTER — NON-APPOINTMENT (OUTPATIENT)
Age: 64
End: 2024-04-15

## 2024-05-08 ENCOUNTER — APPOINTMENT (OUTPATIENT)
Dept: CARDIOLOGY | Facility: CLINIC | Age: 64
End: 2024-05-08
Payer: COMMERCIAL

## 2024-05-08 ENCOUNTER — NON-APPOINTMENT (OUTPATIENT)
Age: 64
End: 2024-05-08

## 2024-05-08 VITALS
DIASTOLIC BLOOD PRESSURE: 66 MMHG | HEART RATE: 69 BPM | SYSTOLIC BLOOD PRESSURE: 120 MMHG | BODY MASS INDEX: 28.49 KG/M2 | WEIGHT: 222 LBS | HEIGHT: 74 IN | OXYGEN SATURATION: 95 %

## 2024-05-08 DIAGNOSIS — I10 ESSENTIAL (PRIMARY) HYPERTENSION: ICD-10-CM

## 2024-05-08 DIAGNOSIS — E78.2 MIXED HYPERLIPIDEMIA: ICD-10-CM

## 2024-05-08 DIAGNOSIS — Z98.890 OTHER SPECIFIED POSTPROCEDURAL STATES: ICD-10-CM

## 2024-05-08 PROCEDURE — 99214 OFFICE O/P EST MOD 30 MIN: CPT

## 2024-05-08 PROCEDURE — G2211 COMPLEX E/M VISIT ADD ON: CPT

## 2024-05-08 PROCEDURE — 93000 ELECTROCARDIOGRAM COMPLETE: CPT

## 2024-05-08 RX ORDER — METOPROLOL TARTRATE 37.5 MG/1
37.5 TABLET, FILM COATED ORAL DAILY
Refills: 0 | Status: DISCONTINUED | COMMUNITY
End: 2024-05-08

## 2024-05-08 RX ORDER — METOPROLOL SUCCINATE 200 MG/1
TABLET, EXTENDED RELEASE ORAL
Refills: 0 | Status: DISCONTINUED | COMMUNITY
End: 2024-05-08

## 2024-05-08 NOTE — DISCUSSION/SUMMARY
[FreeTextEntry1] : 63-year-old gentleman with above medical history and active medical problems as noted below 1.  Mitral and tricuspid valve repair.  Minimal regurgitation noted.  Mild increase mitral gradient which is stable.  Preserved EF.  Normal chamber dimensions.  Clinically class I functional status.  Recommendation includes Normal cardiac MRI. SBE prophylaxis Any change in clinical status which have been reviewed to notify us or call 911 in the form of shortness of breath, chest pain, near syncopal or syncopal event. 2.  Essential hypertension.  Has remained stable and well-controlled .  No orthostatic changes. Recommended to take valsartan at nighttime along with metoprolol in presence of recurrent intermittent dizziness.  Nonpharmacological lifestyle modifications reviewed. 3.  Dyslipidemia.  Significantly elevated triglycerides in the past.    On statin therapy.  Goal would be less than 100 preferably less than 70.  Unable to tolerate higher dose.  Lifestyle modifications reviewed.  Follow liver function tests lipid panel.  Labs ordered. 4.  Intermittent dizziness.  Near syncopal event.  Unclear etiology.  NSVT/PSVT.  Brief episodes not associated with any symptoms of dizziness, near syncopal or syncopal event besides palpitations.  He has preserved EF.  An exercise treadmill stress test nonischemic with no stress-induced arrhythmias. He does have a family history with brother having arrhythmias (2 brothers with A-fib, 1 brother mitral valve repair and pacemaker/not clear whether he has AICD ) requiring EP consultation and management. Cardiac MRI failed to show any significant structural abnormality.  EP study was negative for inducibility of high risk arrhythmias. Continue hydration. He has overall unlimited exercise tolerance without any symptoms. At the site ofcal wound care catheterization was discussed and reviewed with them. Continue with metoprolol at a higher dose recommended as symptoms have improved significantly. Lower probability of high risk arrhythmias which can lead to sudden cardiac death was reviewed with them.  Counseling regarding low saturated fat, salt and carbohydrate intake was reviewed. Active lifestyle and regular. Exercise along with weight management is advised. All the above were at length reviewed. Answered all the questions. Thank you very much for this kind referral. Please do not hesitate to give me a call for any question. Part of this transcription was done with voice recognition software and phonetically similar errors are common. I apologize for that. Please donot hesitate to call for any questions due to above.  Follow-up as advised  Sincerely, Parul Jacobson MD,FACC,FASE  [EKG obtained to assist in diagnosis and management of assessed problem(s)] : EKG obtained to assist in diagnosis and management of assessed problem(s)

## 2024-05-08 NOTE — REASON FOR VISIT
[FreeTextEntry3] : Dr. Cameron [FreeTextEntry1] : 63-year-old gentleman is seen in the office After his electrophysiology study for evaluation of ventricular arrhythmias and near syncopal events.  As you know he was recently seen in follow-up consultation In presence of recent complaint of palpitation, near syncopal events multiple times.  Was seen by Dr. Goldberg.  Had 3-day event monitor which did not show any significant arrhythmia except very brief episodes of PSVT.  According to him he did not have any significant symptoms during that time.  He had an echocardiogram which showed preserved EF and stable valve repairs.  Now he is here to review his exercise treadmill stress test, echocardiograms, event monitoring which has shown NSVT about 7 beats.  He had a cardiac MRI which did not reveal any significant abnormality and now electrophysiology study which was noninducible. His symptoms of palpitations have significantly improved with metoprolol succinate at 37.5 mg.  And has not had any near syncopal or syncopal event. His has no increase in alcohol or caffeine intake.  He has occasional use of sildenafil.  No significant correlation with dose. He has been hydrating himself well. Most of the symptoms last for minutes.  But last Sunday lasted for about 4 hours. There is no associated PND orthopnea.  Does have uncomfortable feeling in the left precordial region at the time along with the fluttering sensation.  He has no headache visual disturbances focal weakness.  He has seen neurologist in the past with negative workup. He has seen ophthalmologist. He has no other over-the-counter medication use.  No sleeping aids.  As you know I been seeing him in presence of mitral and tricuspid valve repair.  He has had dizziness in the past which was evaluated by ENT neurologist without any significant etiology. He is doing his regular exercises without any significant problems.  He denies any associated chest pain or shortness of breath.  And no syncopal event.  His medical history includes  Mitral valve repair for mitral regurgitation.  2010 mild residual mitral regurgitation/mild increase mitral valve gradient. And tricuspid valve repair.  2010 Essential hypertension.  No CHF.  No CKD.  Non-smoker on valsartan 80 mg.  Metoprolol 25 mg. Hyperlipidemia.  On statin therapy

## 2024-05-08 NOTE — CARDIOLOGY SUMMARY
[de-identified] : September 22, 2021.  Normal sinus rhythm November 30, 2022.  Normal sinus rhythm February 7, 2024.  Normal sinus rhythm.  Normal intervals. May 8, 2024.  Normal sinus rhythm incomplete right bundle branch block [de-identified] : Event monitor December 2022.  Extrasystoles.  Nonsustained arrhythmias. Event monitor.  14 days.  2024.  Normal sinus rhythm.  45 -139.  Average 70.  PSVT.  NSVT.  Brief.  No syncope or near syncope.  [de-identified] : 2/28/2024.  Exercise treadmill stress test.  Nonischemic at 7 minutes 54 seconds.  9 METS of workload.  Normal blood pressure response.  103% predicted maximum heart rate.  No exercise induced arrhythmias. [de-identified] : April 6, 2022. LVEF 60% minimal mitral regurgitation annuloplasty mitral and tricuspid position.  Mean mitral gradient 5 mmHg.  Minimal aortic regurgitation.  Minimal tricuspid regurgitation RVSP 19.  Normal left and right atrial size. January 2024.  Preserved EF.  LVEF 60%.  Stable annuloplasty of mitral and tricuspid position.  No significant pericardial effusion. [de-identified] : Cardiac MRI.  April 3, 2024.  LVEF 63%.  No obvious myocardial scar or infarction.  No LGE.  No evidence of an infiltrative cardiomyopathy.  Normal RV size and function. [de-identified] : May 7, 2024.  Negative EP study. [de-identified] : Bilateral carotid Doppler study December 2022 no significant obstruction of carotid or vertebral spine

## 2024-05-08 NOTE — ASSESSMENT
[FreeTextEntry1] : Reviewed June 7, 2023. Labs from April 8, 2023 stable CBC sodium 137 potassium 4.5 creatinine 0.75 hemoglobin A1c 5.7.  LDL 94 HDL 37 triglycerides 108.  Reviewed on February 7, 2024. EKG as noted above Echocardiogram and event monitor as noted above Labs from 12/21/2023 CBC CMP stable hemoglobin A1c 5.6 TSH normal vitamin B12 folate acid level normal HDL 40 triglycerides 113 LDL is 113.  Reviewed on March 5, 2024. Event monitorShowed brief nonsustained episodes of SVT and NSVT.  Associated with palpitations.  No near syncopal or syncopal event. Echocardiogram has shown preserved EF. Exercise treadmill stress test was nonischemic.  No significant exercise related arrhythmias. Labs that show normal metanephrine level.    Reviewed on May 8, 2024. His cardiac MRI, EP studies were reviewed with him.

## 2024-05-08 NOTE — PHYSICAL EXAM
[No Edema] : no edema [Normal Radial B/L] : normal radial B/L [Normal DP B/L] : normal DP B/L [de-identified] : Right groin.  Mild ecchymosis.  No active bleeding.  No swelling.

## 2024-05-15 ENCOUNTER — APPOINTMENT (OUTPATIENT)
Dept: ELECTROPHYSIOLOGY | Facility: CLINIC | Age: 64
End: 2024-05-15
Payer: COMMERCIAL

## 2024-05-15 ENCOUNTER — NON-APPOINTMENT (OUTPATIENT)
Age: 64
End: 2024-05-15

## 2024-05-15 VITALS
HEIGHT: 74 IN | DIASTOLIC BLOOD PRESSURE: 64 MMHG | OXYGEN SATURATION: 97 % | SYSTOLIC BLOOD PRESSURE: 120 MMHG | WEIGHT: 215 LBS | HEART RATE: 76 BPM | BODY MASS INDEX: 27.59 KG/M2

## 2024-05-15 DIAGNOSIS — I47.29 OTHER VENTRICULAR TACHYCARDIA: ICD-10-CM

## 2024-05-15 PROCEDURE — 93000 ELECTROCARDIOGRAM COMPLETE: CPT

## 2024-05-15 PROCEDURE — 99213 OFFICE O/P EST LOW 20 MIN: CPT | Mod: 25

## 2024-05-15 RX ORDER — METOPROLOL TARTRATE 37.5 MG/1
37.5 TABLET, FILM COATED ORAL
Refills: 0 | Status: ACTIVE | COMMUNITY

## 2024-05-15 RX ORDER — METOPROLOL SUCCINATE 25 MG/1
25 TABLET, EXTENDED RELEASE ORAL DAILY
Qty: 135 | Refills: 3 | Status: DISCONTINUED | COMMUNITY
Start: 2024-05-08 | End: 2024-05-15

## 2024-05-15 NOTE — DISCUSSION/SUMMARY
[FreeTextEntry1] : Patient is currently doing well with no recurrent arrhythmia symptoms.  Recommendation is to continue on the beta-blocker dosage.  Recommend he get a follow-up exercise stress test in about a year.  This is mainly looking for his conduction and higher heart rates.  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  He did have previous tricuspid valve related surgery. Discussed his conduction disease and possible need for PPM in future.  We did we discussed the role of an implantable loop monitor.  He will consider this should he have any recurrent symptoms. Recommend the patient resume full activities including exercise moderate.  He will see electrophysiology follow-up should he have any recurrent arrhythmia symptoms.  Recommend he follow-up with his cardiologist Dr. Parul Jacobson as well as his primary care physician. [EKG obtained to assist in diagnosis and management of assessed problem(s)] : EKG obtained to assist in diagnosis and management of assessed problem(s)

## 2024-05-15 NOTE — HISTORY OF PRESENT ILLNESS
[FreeTextEntry1] : Nick is seen in follow-up after recent EP study to evaluate for NSVT.  He is feeling well and has no symptoms related to arrhythmia.  He had no inducible VT or SVT.  He had some mild to moderate HV disease which we will follow over time.  He is on metoprolol 37.5 mg/day which is tolerated.  He had some ecchymoses in the right groin entrance area but this is resolving.  There is no hematoma pain tenderness or discharge.  Overall he is feeling well.   He had an echocardiogram which showed preserved left ventricular function he is status post previous mitral valve repair.   Prior mitral valve and tricuspid valve repair 2010.  Patient had a transthoracic echo on 1/31/2024 that showed EF 60%.  Trace aortic regurgitation.  The annuloplasty ring and trace regurgitation.  There is now annuloplasty ring also in the tricuspid position.  There is no pericardial effusion.  Compared with previous echo from 4/6/2022 there was no significant changes.  Patient had a Zio patch monitor for 13 days starting 2/7/20 24: It showed 7 beats of a wide-complex tachycardia that is flipped and axis suggestive of ventricular.  He also had short runs of an atrial tach 5 beats so you have had some  Symptoms: The patient had symptoms of dizziness in January 2024.  Prior to that he had it more than a year ago which was felt to possibly be middle ear infection.  Since January he has not had a recurrence of his dizziness.  It is about 2 days ago he had a brief episode of lightheadedness that occurred in the setting where he got up very quickly it resolved very quickly.  The patient had exercise stress test performed on 2/20/2024.  He had no exercise-induced arrhythmia.  He exercised to stage III 7 minutes for 4-second duration 9 minutes.

## 2024-05-15 NOTE — REVIEW OF SYSTEMS
[Feeling Fatigued] : not feeling fatigued [SOB] : no shortness of breath [Dyspnea on exertion] : not dyspnea during exertion [Chest Discomfort] : no chest discomfort [Palpitations] : no palpitations [Cough] : no cough [Abdominal Pain] : no abdominal pain [Joint Pain] : no joint pain [de-identified] : see HPI; no ongoing dizziness

## 2024-06-03 ENCOUNTER — APPOINTMENT (OUTPATIENT)
Dept: CARDIOLOGY | Facility: CLINIC | Age: 64
End: 2024-06-03
Payer: COMMERCIAL

## 2024-06-03 VITALS
DIASTOLIC BLOOD PRESSURE: 76 MMHG | BODY MASS INDEX: 29.02 KG/M2 | SYSTOLIC BLOOD PRESSURE: 112 MMHG | OXYGEN SATURATION: 93 % | WEIGHT: 226 LBS | HEART RATE: 75 BPM

## 2024-06-03 DIAGNOSIS — R60.0 LOCALIZED EDEMA: ICD-10-CM

## 2024-06-03 PROCEDURE — 99214 OFFICE O/P EST MOD 30 MIN: CPT

## 2024-06-06 RX ORDER — HYDROCHLOROTHIAZIDE 12.5 MG/1
12.5 TABLET ORAL DAILY
Qty: 90 | Refills: 1 | Status: ACTIVE | COMMUNITY
Start: 2024-06-06 | End: 1900-01-01

## 2024-06-06 NOTE — CARDIOLOGY SUMMARY
[___] : [unfilled] [LVEF ___%] : LVEF [unfilled]% [None] : no pulmonary hypertension [Normal] : normal LA size [de-identified] : September 22, 2021.  Normal sinus rhythm November 30, 2022.  Normal sinus rhythm February 7, 2024.  Normal sinus rhythm.  Normal intervals. May 8, 2024.  Normal sinus rhythm incomplete right bundle branch block [de-identified] : Event monitor December 2022.  Extrasystoles.  Nonsustained arrhythmias. Event monitor.  14 days.  2024.  Normal sinus rhythm.  45 -139.  Average 70.  PSVT.  NSVT.  Brief.  No syncope or near syncope.  [de-identified] : 2/28/2024.  Exercise treadmill stress test.  Nonischemic at 7 minutes 54 seconds.  9 METS of workload.  Normal blood pressure response.  103% predicted maximum heart rate.  No exercise induced arrhythmias. [de-identified] : April 6, 2022. LVEF 60% minimal mitral regurgitation annuloplasty mitral and tricuspid position.  Mean mitral gradient 5 mmHg.  Minimal aortic regurgitation.  Minimal tricuspid regurgitation RVSP 19.  Normal left and right atrial size. January 2024.  Preserved EF.  LVEF 60%.  Stable annuloplasty of mitral and tricuspid position.  No significant pericardial effusion. [de-identified] : Cardiac MRI.  April 3, 2024.  LVEF 63%.  No obvious myocardial scar or infarction.  No LGE.  No evidence of an infiltrative cardiomyopathy.  Normal RV size and function. [de-identified] : May 7, 2024.  Negative EP study. [de-identified] : Bilateral carotid Doppler study December 2022 no significant obstruction of carotid or vertebral spine

## 2024-06-06 NOTE — REASON FOR VISIT
[FreeTextEntry1] : ANDRIY OH  is a 63 year M  who presents today Jun 03, 2024 requesting to be seen for swelling in bilateral feet and ankle region over the past few weeks. Swelling slightly improves in the morning and progresses as day goes on. Status post ablation with Dr. Alves beginning of May. There is no pain to the calf. No recent travel.    Overall he has been feeling well. There has been no recent illness or hospital stay. Medications have remained unchanged. Asymptomatic from cardiovascular and arrhythmia standpoint.   Today he denies chest pain, pressure, unusual shortness of breath, lightheadedness, dizziness, near syncope or syncope.   Past history includes - HTN - HLD - NSVT/PSVT s/p ablation - Mitral and tricuspid valve repair

## 2024-06-06 NOTE — PHYSICAL EXAM

## 2024-06-06 NOTE — ASSESSMENT
[FreeTextEntry1] : ANDRIY OH  is a 63 year M  who presents today Jun 03, 2024 with the above history and the following active issues.   Bilateral lower extremity swelling and edema Recommend the following - bilateral venous duplex r/o DVT - BMP and BNP - if testing normal consider addition of HCTZ 12.5mg QD - Reduction in salt intake - elevate lower extremity - consider use of compression stockings  HTN Blood pressure well controlled on my assessment Continue Valsartan and Metoprolol Low sodium diet Remain active with cardiovascular exercise  Mitral and tricuspid valve repair Normal cardiac MRI Echo stable SBE prophylaxis  HLD Continue Atorvastatin Low cholesterol diet Lifestyle and risk factor modification  Red flag symptoms which would warrant sooner emergent evaluation reviewed with the patient.  Questions and concerns were addressed and answered.   Sincerely,  Elsie Kulkarni PA-C Patients history, testing and plan reviewed with supervising MD: Dr. Parul Jacobson

## 2024-11-06 ENCOUNTER — NON-APPOINTMENT (OUTPATIENT)
Age: 64
End: 2024-11-06

## 2024-11-06 ENCOUNTER — APPOINTMENT (OUTPATIENT)
Dept: CARDIOLOGY | Facility: CLINIC | Age: 64
End: 2024-11-06
Payer: COMMERCIAL

## 2024-11-06 VITALS
WEIGHT: 226 LBS | OXYGEN SATURATION: 96 % | BODY MASS INDEX: 29 KG/M2 | SYSTOLIC BLOOD PRESSURE: 124 MMHG | DIASTOLIC BLOOD PRESSURE: 70 MMHG | HEIGHT: 74 IN | HEART RATE: 71 BPM

## 2024-11-06 DIAGNOSIS — I10 ESSENTIAL (PRIMARY) HYPERTENSION: ICD-10-CM

## 2024-11-06 DIAGNOSIS — I34.0 NONRHEUMATIC MITRAL (VALVE) INSUFFICIENCY: ICD-10-CM

## 2024-11-06 DIAGNOSIS — Z98.890 OTHER SPECIFIED POSTPROCEDURAL STATES: ICD-10-CM

## 2024-11-06 DIAGNOSIS — E78.2 MIXED HYPERLIPIDEMIA: ICD-10-CM

## 2024-11-06 PROCEDURE — G2211 COMPLEX E/M VISIT ADD ON: CPT | Mod: NC

## 2024-11-06 PROCEDURE — 99214 OFFICE O/P EST MOD 30 MIN: CPT

## 2025-02-12 ENCOUNTER — APPOINTMENT (OUTPATIENT)
Dept: ORTHOPEDIC SURGERY | Facility: CLINIC | Age: 65
End: 2025-02-12
Payer: COMMERCIAL

## 2025-02-12 DIAGNOSIS — M54.12 RADICULOPATHY, CERVICAL REGION: ICD-10-CM

## 2025-02-12 DIAGNOSIS — M18.12 UNILATERAL PRIMARY OSTEOARTHRITIS OF FIRST CARPOMETACARPAL JOINT, LEFT HAND: ICD-10-CM

## 2025-02-12 DIAGNOSIS — M65.321 TRIGGER FINGER, RIGHT INDEX FINGER: ICD-10-CM

## 2025-02-12 PROCEDURE — 73030 X-RAY EXAM OF SHOULDER: CPT | Mod: RT

## 2025-02-12 PROCEDURE — 99213 OFFICE O/P EST LOW 20 MIN: CPT | Mod: 25

## 2025-02-12 PROCEDURE — 72040 X-RAY EXAM NECK SPINE 2-3 VW: CPT

## 2025-02-12 PROCEDURE — 73130 X-RAY EXAM OF HAND: CPT | Mod: 50

## 2025-03-26 ENCOUNTER — APPOINTMENT (OUTPATIENT)
Dept: MRI IMAGING | Facility: CLINIC | Age: 65
End: 2025-03-26
Payer: COMMERCIAL

## 2025-03-26 ENCOUNTER — RESULT REVIEW (OUTPATIENT)
Age: 65
End: 2025-03-26

## 2025-03-26 PROCEDURE — 72141 MRI NECK SPINE W/O DYE: CPT

## 2025-04-03 ENCOUNTER — APPOINTMENT (OUTPATIENT)
Dept: ORTHOPEDIC SURGERY | Facility: CLINIC | Age: 65
End: 2025-04-03

## 2025-04-03 DIAGNOSIS — M54.12 RADICULOPATHY, CERVICAL REGION: ICD-10-CM

## 2025-04-03 PROCEDURE — 99213 OFFICE O/P EST LOW 20 MIN: CPT

## 2025-05-13 ENCOUNTER — APPOINTMENT (OUTPATIENT)
Dept: CARDIOLOGY | Facility: CLINIC | Age: 65
End: 2025-05-13
Payer: COMMERCIAL

## 2025-05-13 ENCOUNTER — NON-APPOINTMENT (OUTPATIENT)
Age: 65
End: 2025-05-13

## 2025-05-13 VITALS
DIASTOLIC BLOOD PRESSURE: 66 MMHG | SYSTOLIC BLOOD PRESSURE: 112 MMHG | OXYGEN SATURATION: 96 % | HEIGHT: 74 IN | WEIGHT: 228 LBS | BODY MASS INDEX: 29.26 KG/M2 | HEART RATE: 75 BPM

## 2025-05-13 DIAGNOSIS — M26.69 OTHER SPECIFIED DISORDERS OF TEMPOROMANDIBULAR JOINT: ICD-10-CM

## 2025-05-13 DIAGNOSIS — I10 ESSENTIAL (PRIMARY) HYPERTENSION: ICD-10-CM

## 2025-05-13 DIAGNOSIS — E78.00 PURE HYPERCHOLESTEROLEMIA, UNSPECIFIED: ICD-10-CM

## 2025-05-13 DIAGNOSIS — I34.0 NONRHEUMATIC MITRAL (VALVE) INSUFFICIENCY: ICD-10-CM

## 2025-05-13 DIAGNOSIS — R42 DIZZINESS AND GIDDINESS: ICD-10-CM

## 2025-05-13 PROCEDURE — 93000 ELECTROCARDIOGRAM COMPLETE: CPT

## 2025-05-13 PROCEDURE — 99214 OFFICE O/P EST MOD 30 MIN: CPT

## 2025-05-15 RX ORDER — METOPROLOL SUCCINATE 25 MG/1
25 TABLET, EXTENDED RELEASE ORAL DAILY
Qty: 135 | Refills: 2 | Status: ACTIVE | COMMUNITY
Start: 1900-01-01 | End: 1900-01-01

## 2025-05-19 ENCOUNTER — NON-APPOINTMENT (OUTPATIENT)
Age: 65
End: 2025-05-19

## 2025-05-19 LAB — HBA1C MFR BLD HPLC: 5.9

## 2025-06-13 ENCOUNTER — APPOINTMENT (OUTPATIENT)
Dept: CARDIOLOGY | Facility: CLINIC | Age: 65
End: 2025-06-13
Payer: COMMERCIAL

## 2025-06-13 PROCEDURE — 93306 TTE W/DOPPLER COMPLETE: CPT

## 2025-06-18 ENCOUNTER — APPOINTMENT (OUTPATIENT)
Dept: CARDIOLOGY | Facility: CLINIC | Age: 65
End: 2025-06-18

## 2025-06-18 ENCOUNTER — APPOINTMENT (OUTPATIENT)
Dept: CARDIOLOGY | Facility: CLINIC | Age: 65
End: 2025-06-18
Payer: COMMERCIAL

## 2025-06-18 VITALS
WEIGHT: 231 LBS | HEART RATE: 88 BPM | BODY MASS INDEX: 29.65 KG/M2 | SYSTOLIC BLOOD PRESSURE: 130 MMHG | OXYGEN SATURATION: 94 % | DIASTOLIC BLOOD PRESSURE: 74 MMHG | HEIGHT: 74 IN

## 2025-06-18 PROCEDURE — 99214 OFFICE O/P EST MOD 30 MIN: CPT

## 2025-06-19 ENCOUNTER — APPOINTMENT (OUTPATIENT)
Dept: ELECTROPHYSIOLOGY | Facility: CLINIC | Age: 65
End: 2025-06-19

## 2025-06-19 ENCOUNTER — NON-APPOINTMENT (OUTPATIENT)
Age: 65
End: 2025-06-19

## 2025-06-19 VITALS
BODY MASS INDEX: 29.65 KG/M2 | HEART RATE: 77 BPM | HEIGHT: 74 IN | SYSTOLIC BLOOD PRESSURE: 130 MMHG | DIASTOLIC BLOOD PRESSURE: 68 MMHG | OXYGEN SATURATION: 97 % | WEIGHT: 231 LBS

## 2025-06-19 PROBLEM — I44.1 MOBITZ TYPE I WENCKEBACH ATRIOVENTRICULAR BLOCK: Status: ACTIVE | Noted: 2025-06-19

## 2025-06-19 PROCEDURE — 93000 ELECTROCARDIOGRAM COMPLETE: CPT | Mod: 59

## 2025-06-19 PROCEDURE — 99205 OFFICE O/P NEW HI 60 MIN: CPT | Mod: 25

## 2025-06-19 RX ORDER — FAMOTIDINE 10 MG/1
10 TABLET, FILM COATED ORAL
Refills: 0 | Status: ACTIVE | COMMUNITY

## 2025-06-20 PROCEDURE — 93228 REMOTE 30 DAY ECG REV/REPORT: CPT

## 2025-07-10 ENCOUNTER — APPOINTMENT (OUTPATIENT)
Dept: ORTHOPEDIC SURGERY | Facility: CLINIC | Age: 65
End: 2025-07-10

## 2025-07-10 PROBLEM — S60.222A CONTUSION OF LEFT HAND, INITIAL ENCOUNTER: Status: ACTIVE | Noted: 2025-07-10

## 2025-07-10 PROCEDURE — 99213 OFFICE O/P EST LOW 20 MIN: CPT | Mod: 25

## 2025-07-10 PROCEDURE — 73130 X-RAY EXAM OF HAND: CPT | Mod: LT

## 2025-07-10 PROCEDURE — L3809: CPT | Mod: LT

## 2025-07-10 RX ORDER — CELECOXIB 200 MG/1
200 CAPSULE ORAL
Qty: 30 | Refills: 0 | Status: ACTIVE | COMMUNITY
Start: 2025-07-10 | End: 1900-01-01

## 2025-07-21 ENCOUNTER — APPOINTMENT (OUTPATIENT)
Dept: ORTHOPEDIC SURGERY | Facility: CLINIC | Age: 65
End: 2025-07-21